# Patient Record
Sex: FEMALE | Race: WHITE | NOT HISPANIC OR LATINO | Employment: FULL TIME | URBAN - METROPOLITAN AREA
[De-identification: names, ages, dates, MRNs, and addresses within clinical notes are randomized per-mention and may not be internally consistent; named-entity substitution may affect disease eponyms.]

---

## 2021-07-20 ENCOUNTER — LAB VISIT (OUTPATIENT)
Dept: LAB | Facility: HOSPITAL | Age: 41
End: 2021-07-20
Attending: INTERNAL MEDICINE
Payer: COMMERCIAL

## 2021-07-20 ENCOUNTER — OFFICE VISIT (OUTPATIENT)
Dept: GASTROENTEROLOGY | Facility: CLINIC | Age: 41
End: 2021-07-20
Payer: COMMERCIAL

## 2021-07-20 VITALS
HEART RATE: 95 BPM | BODY MASS INDEX: 22.29 KG/M2 | DIASTOLIC BLOOD PRESSURE: 75 MMHG | HEIGHT: 60 IN | RESPIRATION RATE: 16 BRPM | SYSTOLIC BLOOD PRESSURE: 99 MMHG | WEIGHT: 113.56 LBS | OXYGEN SATURATION: 99 %

## 2021-07-20 DIAGNOSIS — R14.0 BLOATING: ICD-10-CM

## 2021-07-20 DIAGNOSIS — K59.09 CHRONIC CONSTIPATION: Primary | ICD-10-CM

## 2021-07-20 DIAGNOSIS — E73.9 LACTOSE INTOLERANCE: ICD-10-CM

## 2021-07-20 DIAGNOSIS — K59.09 CHRONIC CONSTIPATION: ICD-10-CM

## 2021-07-20 DIAGNOSIS — R10.30 LOWER ABDOMINAL PAIN: ICD-10-CM

## 2021-07-20 LAB
CRP SERPL-MCNC: 0.2 MG/L (ref 0–8.2)
ERYTHROCYTE [SEDIMENTATION RATE] IN BLOOD BY WESTERGREN METHOD: <2 MM/HR (ref 0–36)

## 2021-07-20 PROCEDURE — 99999 PR PBB SHADOW E&M-NEW PATIENT-LVL IV: CPT | Mod: PBBFAC,,, | Performed by: INTERNAL MEDICINE

## 2021-07-20 PROCEDURE — 99203 PR OFFICE/OUTPT VISIT, NEW, LEVL III, 30-44 MIN: ICD-10-PCS | Mod: S$GLB,,, | Performed by: INTERNAL MEDICINE

## 2021-07-20 PROCEDURE — 99203 OFFICE O/P NEW LOW 30 MIN: CPT | Mod: S$GLB,,, | Performed by: INTERNAL MEDICINE

## 2021-07-20 PROCEDURE — 36415 COLL VENOUS BLD VENIPUNCTURE: CPT | Mod: PO | Performed by: INTERNAL MEDICINE

## 2021-07-20 PROCEDURE — 3008F PR BODY MASS INDEX (BMI) DOCUMENTED: ICD-10-PCS | Mod: CPTII,S$GLB,, | Performed by: INTERNAL MEDICINE

## 2021-07-20 PROCEDURE — 3008F BODY MASS INDEX DOCD: CPT | Mod: CPTII,S$GLB,, | Performed by: INTERNAL MEDICINE

## 2021-07-20 PROCEDURE — 85652 RBC SED RATE AUTOMATED: CPT | Performed by: INTERNAL MEDICINE

## 2021-07-20 PROCEDURE — 1125F PR PAIN SEVERITY QUANTIFIED, PAIN PRESENT: ICD-10-PCS | Mod: CPTII,S$GLB,, | Performed by: INTERNAL MEDICINE

## 2021-07-20 PROCEDURE — 86140 C-REACTIVE PROTEIN: CPT | Performed by: INTERNAL MEDICINE

## 2021-07-20 PROCEDURE — 99999 PR PBB SHADOW E&M-NEW PATIENT-LVL IV: ICD-10-PCS | Mod: PBBFAC,,, | Performed by: INTERNAL MEDICINE

## 2021-07-20 PROCEDURE — 1125F AMNT PAIN NOTED PAIN PRSNT: CPT | Mod: CPTII,S$GLB,, | Performed by: INTERNAL MEDICINE

## 2021-08-03 ENCOUNTER — OFFICE VISIT (OUTPATIENT)
Dept: OBSTETRICS AND GYNECOLOGY | Facility: CLINIC | Age: 41
End: 2021-08-03
Payer: COMMERCIAL

## 2021-08-03 ENCOUNTER — LAB VISIT (OUTPATIENT)
Dept: LAB | Facility: HOSPITAL | Age: 41
End: 2021-08-03
Attending: NURSE PRACTITIONER
Payer: COMMERCIAL

## 2021-08-03 VITALS
SYSTOLIC BLOOD PRESSURE: 120 MMHG | BODY MASS INDEX: 22.84 KG/M2 | DIASTOLIC BLOOD PRESSURE: 68 MMHG | WEIGHT: 116.94 LBS

## 2021-08-03 DIAGNOSIS — Z12.31 ENCOUNTER FOR SCREENING MAMMOGRAM FOR MALIGNANT NEOPLASM OF BREAST: ICD-10-CM

## 2021-08-03 DIAGNOSIS — Z01.419 ENCOUNTER FOR ANNUAL ROUTINE GYNECOLOGICAL EXAMINATION: Primary | ICD-10-CM

## 2021-08-03 DIAGNOSIS — Z11.3 SCREEN FOR STD (SEXUALLY TRANSMITTED DISEASE): ICD-10-CM

## 2021-08-03 DIAGNOSIS — Z12.4 SCREENING FOR MALIGNANT NEOPLASM OF CERVIX: ICD-10-CM

## 2021-08-03 PROCEDURE — 3074F PR MOST RECENT SYSTOLIC BLOOD PRESSURE < 130 MM HG: ICD-10-PCS | Mod: CPTII,S$GLB,, | Performed by: NURSE PRACTITIONER

## 2021-08-03 PROCEDURE — 99385 PR PREVENTIVE VISIT,NEW,18-39: ICD-10-PCS | Mod: S$GLB,,, | Performed by: NURSE PRACTITIONER

## 2021-08-03 PROCEDURE — 1160F RVW MEDS BY RX/DR IN RCRD: CPT | Mod: CPTII,S$GLB,, | Performed by: NURSE PRACTITIONER

## 2021-08-03 PROCEDURE — 1160F PR REVIEW ALL MEDS BY PRESCRIBER/CLIN PHARMACIST DOCUMENTED: ICD-10-PCS | Mod: CPTII,S$GLB,, | Performed by: NURSE PRACTITIONER

## 2021-08-03 PROCEDURE — 80074 ACUTE HEPATITIS PANEL: CPT | Performed by: NURSE PRACTITIONER

## 2021-08-03 PROCEDURE — 87389 HIV-1 AG W/HIV-1&-2 AB AG IA: CPT | Performed by: NURSE PRACTITIONER

## 2021-08-03 PROCEDURE — 86592 SYPHILIS TEST NON-TREP QUAL: CPT | Performed by: NURSE PRACTITIONER

## 2021-08-03 PROCEDURE — 99385 PREV VISIT NEW AGE 18-39: CPT | Mod: S$GLB,,, | Performed by: NURSE PRACTITIONER

## 2021-08-03 PROCEDURE — 36415 COLL VENOUS BLD VENIPUNCTURE: CPT | Mod: PN | Performed by: NURSE PRACTITIONER

## 2021-08-03 PROCEDURE — 87491 CHLMYD TRACH DNA AMP PROBE: CPT | Mod: 59 | Performed by: NURSE PRACTITIONER

## 2021-08-03 PROCEDURE — 87661 TRICHOMONAS VAGINALIS AMPLIF: CPT | Performed by: NURSE PRACTITIONER

## 2021-08-03 PROCEDURE — 3078F DIAST BP <80 MM HG: CPT | Mod: CPTII,S$GLB,, | Performed by: NURSE PRACTITIONER

## 2021-08-03 PROCEDURE — 88175 CYTOPATH C/V AUTO FLUID REDO: CPT | Performed by: NURSE PRACTITIONER

## 2021-08-03 PROCEDURE — 87591 N.GONORRHOEAE DNA AMP PROB: CPT | Performed by: NURSE PRACTITIONER

## 2021-08-03 PROCEDURE — 87624 HPV HI-RISK TYP POOLED RSLT: CPT | Performed by: NURSE PRACTITIONER

## 2021-08-03 PROCEDURE — 3074F SYST BP LT 130 MM HG: CPT | Mod: CPTII,S$GLB,, | Performed by: NURSE PRACTITIONER

## 2021-08-03 PROCEDURE — 3078F PR MOST RECENT DIASTOLIC BLOOD PRESSURE < 80 MM HG: ICD-10-PCS | Mod: CPTII,S$GLB,, | Performed by: NURSE PRACTITIONER

## 2021-08-03 PROCEDURE — 1159F PR MEDICATION LIST DOCUMENTED IN MEDICAL RECORD: ICD-10-PCS | Mod: CPTII,S$GLB,, | Performed by: NURSE PRACTITIONER

## 2021-08-03 PROCEDURE — 87481 CANDIDA DNA AMP PROBE: CPT | Mod: 59 | Performed by: NURSE PRACTITIONER

## 2021-08-03 PROCEDURE — 1159F MED LIST DOCD IN RCRD: CPT | Mod: CPTII,S$GLB,, | Performed by: NURSE PRACTITIONER

## 2021-08-03 PROCEDURE — 86695 HERPES SIMPLEX TYPE 1 TEST: CPT | Performed by: NURSE PRACTITIONER

## 2021-08-03 PROCEDURE — 99999 PR PBB SHADOW E&M-EST. PATIENT-LVL III: CPT | Mod: PBBFAC,,, | Performed by: NURSE PRACTITIONER

## 2021-08-03 PROCEDURE — 3008F PR BODY MASS INDEX (BMI) DOCUMENTED: ICD-10-PCS | Mod: CPTII,S$GLB,, | Performed by: NURSE PRACTITIONER

## 2021-08-03 PROCEDURE — 99999 PR PBB SHADOW E&M-EST. PATIENT-LVL III: ICD-10-PCS | Mod: PBBFAC,,, | Performed by: NURSE PRACTITIONER

## 2021-08-03 PROCEDURE — 3008F BODY MASS INDEX DOCD: CPT | Mod: CPTII,S$GLB,, | Performed by: NURSE PRACTITIONER

## 2021-08-03 PROCEDURE — 1126F AMNT PAIN NOTED NONE PRSNT: CPT | Mod: CPTII,S$GLB,, | Performed by: NURSE PRACTITIONER

## 2021-08-03 PROCEDURE — 1126F PR PAIN SEVERITY QUANTIFIED, NO PAIN PRESENT: ICD-10-PCS | Mod: CPTII,S$GLB,, | Performed by: NURSE PRACTITIONER

## 2021-08-04 LAB
HAV IGM SERPL QL IA: NEGATIVE
HBV CORE IGM SERPL QL IA: NEGATIVE
HBV SURFACE AG SERPL QL IA: NEGATIVE
HCV AB SERPL QL IA: NEGATIVE
HIV 1+2 AB+HIV1 P24 AG SERPL QL IA: NEGATIVE
RPR SER QL: NORMAL

## 2021-08-05 LAB
HSV1 IGG SERPL QL IA: NEGATIVE
HSV2 IGG SERPL QL IA: NEGATIVE

## 2021-08-06 LAB
BACTERIAL VAGINOSIS DNA: NEGATIVE
C TRACH DNA SPEC QL NAA+PROBE: NOT DETECTED
CANDIDA GLABRATA DNA: NEGATIVE
CANDIDA KRUSEI DNA: NEGATIVE
CANDIDA RRNA VAG QL PROBE: NEGATIVE
CLINICAL INFO: NORMAL
CYTO CVX: NORMAL
CYTOLOGIST CVX/VAG CYTO: NORMAL
CYTOLOGIST CVX/VAG CYTO: NORMAL
CYTOLOGY CMNT CVX/VAG CYTO-IMP: NORMAL
CYTOLOGY PAP THIN PREP EXPLANATION: NORMAL
DATE OF PREVIOUS PAP: NORMAL
DATE PREVIOUS BX: NO
GEN CATEG CVX/VAG CYTO-IMP: NORMAL
HPV I/H RISK 4 DNA CVX QL NAA+PROBE: NOT DETECTED
LMP START DATE: NORMAL
MICROORGANISM CVX/VAG CYTO: NORMAL
N GONORRHOEA DNA SPEC QL NAA+PROBE: NOT DETECTED
PATHOLOGIST CVX/VAG CYTO: NORMAL
SERVICE CMNT-IMP: NORMAL
SPECIMEN SOURCE CVX/VAG CYTO: NORMAL
STAT OF ADQ CVX/VAG CYTO-IMP: NORMAL
T VAGINALIS RRNA GENITAL QL PROBE: NEGATIVE

## 2021-09-29 ENCOUNTER — OFFICE VISIT (OUTPATIENT)
Dept: PSYCHIATRY | Facility: CLINIC | Age: 41
End: 2021-09-29
Payer: COMMERCIAL

## 2021-09-29 ENCOUNTER — CLINICAL SUPPORT (OUTPATIENT)
Dept: PSYCHIATRY | Facility: CLINIC | Age: 41
End: 2021-09-29
Payer: COMMERCIAL

## 2021-09-29 VITALS
BODY MASS INDEX: 22.34 KG/M2 | HEART RATE: 72 BPM | HEIGHT: 60 IN | SYSTOLIC BLOOD PRESSURE: 101 MMHG | DIASTOLIC BLOOD PRESSURE: 56 MMHG | WEIGHT: 113.81 LBS

## 2021-09-29 DIAGNOSIS — F33.1 MODERATE EPISODE OF RECURRENT MAJOR DEPRESSIVE DISORDER: ICD-10-CM

## 2021-09-29 DIAGNOSIS — F42.9 OBSESSIVE-COMPULSIVE DISORDER, UNSPECIFIED TYPE: ICD-10-CM

## 2021-09-29 DIAGNOSIS — F90.2 ADHD (ATTENTION DEFICIT HYPERACTIVITY DISORDER), COMBINED TYPE: Primary | ICD-10-CM

## 2021-09-29 LAB
AMPHET+METHAMPHET UR QL: NEGATIVE
BARBITURATES UR QL SCN>200 NG/ML: NEGATIVE
BENZODIAZ UR QL SCN>200 NG/ML: NEGATIVE
BZE UR QL SCN: NEGATIVE
CANNABINOIDS UR QL SCN: NEGATIVE
CREAT UR-MCNC: 34 MG/DL (ref 15–325)
ETHANOL UR-MCNC: <10 MG/DL
METHADONE UR QL SCN>300 NG/ML: NEGATIVE
OPIATES UR QL SCN: NEGATIVE
PCP UR QL SCN>25 NG/ML: NEGATIVE
TOXICOLOGY INFORMATION: NORMAL

## 2021-09-29 PROCEDURE — 90792 PR PSYCHIATRIC DIAGNOSTIC EVALUATION W/MEDICAL SERVICES: ICD-10-PCS | Mod: S$GLB,,, | Performed by: PHYSICIAN ASSISTANT

## 2021-09-29 PROCEDURE — 1159F PR MEDICATION LIST DOCUMENTED IN MEDICAL RECORD: ICD-10-PCS | Mod: CPTII,S$GLB,, | Performed by: PHYSICIAN ASSISTANT

## 2021-09-29 PROCEDURE — 99999 PR PBB SHADOW E&M-EST. PATIENT-LVL III: CPT | Mod: PBBFAC,,, | Performed by: PHYSICIAN ASSISTANT

## 2021-09-29 PROCEDURE — 1160F RVW MEDS BY RX/DR IN RCRD: CPT | Mod: CPTII,S$GLB,, | Performed by: PHYSICIAN ASSISTANT

## 2021-09-29 PROCEDURE — 99999 PR PBB SHADOW E&M-EST. PATIENT-LVL III: ICD-10-PCS | Mod: PBBFAC,,, | Performed by: PHYSICIAN ASSISTANT

## 2021-09-29 PROCEDURE — 3074F PR MOST RECENT SYSTOLIC BLOOD PRESSURE < 130 MM HG: ICD-10-PCS | Mod: CPTII,S$GLB,, | Performed by: PHYSICIAN ASSISTANT

## 2021-09-29 PROCEDURE — 1159F MED LIST DOCD IN RCRD: CPT | Mod: CPTII,S$GLB,, | Performed by: PHYSICIAN ASSISTANT

## 2021-09-29 PROCEDURE — 3008F BODY MASS INDEX DOCD: CPT | Mod: CPTII,S$GLB,, | Performed by: PHYSICIAN ASSISTANT

## 2021-09-29 PROCEDURE — 3078F DIAST BP <80 MM HG: CPT | Mod: CPTII,S$GLB,, | Performed by: PHYSICIAN ASSISTANT

## 2021-09-29 PROCEDURE — 1160F PR REVIEW ALL MEDS BY PRESCRIBER/CLIN PHARMACIST DOCUMENTED: ICD-10-PCS | Mod: CPTII,S$GLB,, | Performed by: PHYSICIAN ASSISTANT

## 2021-09-29 PROCEDURE — 3074F SYST BP LT 130 MM HG: CPT | Mod: CPTII,S$GLB,, | Performed by: PHYSICIAN ASSISTANT

## 2021-09-29 PROCEDURE — 90792 PSYCH DIAG EVAL W/MED SRVCS: CPT | Mod: S$GLB,,, | Performed by: PHYSICIAN ASSISTANT

## 2021-09-29 PROCEDURE — 3078F PR MOST RECENT DIASTOLIC BLOOD PRESSURE < 80 MM HG: ICD-10-PCS | Mod: CPTII,S$GLB,, | Performed by: PHYSICIAN ASSISTANT

## 2021-09-29 PROCEDURE — 3008F PR BODY MASS INDEX (BMI) DOCUMENTED: ICD-10-PCS | Mod: CPTII,S$GLB,, | Performed by: PHYSICIAN ASSISTANT

## 2021-09-29 PROCEDURE — 80307 DRUG TEST PRSMV CHEM ANLYZR: CPT | Performed by: PHYSICIAN ASSISTANT

## 2021-09-29 RX ORDER — LISDEXAMFETAMINE DIMESYLATE CAPSULES 20 MG/1
20 CAPSULE ORAL EVERY MORNING
Qty: 30 CAPSULE | Refills: 0 | Status: SHIPPED | OUTPATIENT
Start: 2021-09-29 | End: 2021-09-30 | Stop reason: ALTCHOICE

## 2021-09-29 RX ORDER — FLUOXETINE 10 MG/1
10 CAPSULE ORAL DAILY
Qty: 30 CAPSULE | Refills: 0 | Status: SHIPPED | OUTPATIENT
Start: 2021-09-29 | End: 2021-10-26

## 2021-09-29 RX ORDER — BUPROPION HYDROCHLORIDE 150 MG/1
150 TABLET ORAL DAILY
Qty: 30 TABLET | Refills: 1 | Status: SHIPPED | OUTPATIENT
Start: 2021-09-29 | End: 2021-10-25

## 2021-09-30 ENCOUNTER — PATIENT MESSAGE (OUTPATIENT)
Dept: PSYCHIATRY | Facility: CLINIC | Age: 41
End: 2021-09-30

## 2021-09-30 DIAGNOSIS — F90.2 ADHD (ATTENTION DEFICIT HYPERACTIVITY DISORDER), COMBINED TYPE: Primary | ICD-10-CM

## 2021-09-30 RX ORDER — DEXTROAMPHETAMINE SACCHARATE, AMPHETAMINE ASPARTATE MONOHYDRATE, DEXTROAMPHETAMINE SULFATE AND AMPHETAMINE SULFATE 2.5; 2.5; 2.5; 2.5 MG/1; MG/1; MG/1; MG/1
10 CAPSULE, EXTENDED RELEASE ORAL EVERY MORNING
Qty: 30 CAPSULE | Refills: 0 | Status: SHIPPED | OUTPATIENT
Start: 2021-09-30 | End: 2021-10-04 | Stop reason: SDUPTHER

## 2021-10-04 DIAGNOSIS — F90.2 ADHD (ATTENTION DEFICIT HYPERACTIVITY DISORDER), COMBINED TYPE: ICD-10-CM

## 2021-10-04 RX ORDER — DEXTROAMPHETAMINE SACCHARATE, AMPHETAMINE ASPARTATE MONOHYDRATE, DEXTROAMPHETAMINE SULFATE AND AMPHETAMINE SULFATE 2.5; 2.5; 2.5; 2.5 MG/1; MG/1; MG/1; MG/1
10 CAPSULE, EXTENDED RELEASE ORAL EVERY MORNING
Qty: 30 CAPSULE | Refills: 0 | Status: SHIPPED | OUTPATIENT
Start: 2021-10-04 | End: 2021-10-27 | Stop reason: ALTCHOICE

## 2021-10-27 ENCOUNTER — OFFICE VISIT (OUTPATIENT)
Dept: PSYCHIATRY | Facility: CLINIC | Age: 41
End: 2021-10-27
Payer: COMMERCIAL

## 2021-10-27 VITALS
SYSTOLIC BLOOD PRESSURE: 114 MMHG | WEIGHT: 111.13 LBS | DIASTOLIC BLOOD PRESSURE: 55 MMHG | BODY MASS INDEX: 21.7 KG/M2 | HEART RATE: 89 BPM

## 2021-10-27 DIAGNOSIS — F33.41 RECURRENT MAJOR DEPRESSIVE DISORDER, IN PARTIAL REMISSION: ICD-10-CM

## 2021-10-27 DIAGNOSIS — F90.2 ADHD (ATTENTION DEFICIT HYPERACTIVITY DISORDER), COMBINED TYPE: Primary | ICD-10-CM

## 2021-10-27 PROCEDURE — 3078F PR MOST RECENT DIASTOLIC BLOOD PRESSURE < 80 MM HG: ICD-10-PCS | Mod: CPTII,S$GLB,, | Performed by: PHYSICIAN ASSISTANT

## 2021-10-27 PROCEDURE — 3008F PR BODY MASS INDEX (BMI) DOCUMENTED: ICD-10-PCS | Mod: CPTII,S$GLB,, | Performed by: PHYSICIAN ASSISTANT

## 2021-10-27 PROCEDURE — 3074F SYST BP LT 130 MM HG: CPT | Mod: CPTII,S$GLB,, | Performed by: PHYSICIAN ASSISTANT

## 2021-10-27 PROCEDURE — 1160F PR REVIEW ALL MEDS BY PRESCRIBER/CLIN PHARMACIST DOCUMENTED: ICD-10-PCS | Mod: CPTII,S$GLB,, | Performed by: PHYSICIAN ASSISTANT

## 2021-10-27 PROCEDURE — 99999 PR PBB SHADOW E&M-EST. PATIENT-LVL II: ICD-10-PCS | Mod: PBBFAC,,, | Performed by: PHYSICIAN ASSISTANT

## 2021-10-27 PROCEDURE — 1160F RVW MEDS BY RX/DR IN RCRD: CPT | Mod: CPTII,S$GLB,, | Performed by: PHYSICIAN ASSISTANT

## 2021-10-27 PROCEDURE — 99214 OFFICE O/P EST MOD 30 MIN: CPT | Mod: S$GLB,,, | Performed by: PHYSICIAN ASSISTANT

## 2021-10-27 PROCEDURE — 3078F DIAST BP <80 MM HG: CPT | Mod: CPTII,S$GLB,, | Performed by: PHYSICIAN ASSISTANT

## 2021-10-27 PROCEDURE — 99214 PR OFFICE/OUTPT VISIT, EST, LEVL IV, 30-39 MIN: ICD-10-PCS | Mod: S$GLB,,, | Performed by: PHYSICIAN ASSISTANT

## 2021-10-27 PROCEDURE — 3008F BODY MASS INDEX DOCD: CPT | Mod: CPTII,S$GLB,, | Performed by: PHYSICIAN ASSISTANT

## 2021-10-27 PROCEDURE — 1159F PR MEDICATION LIST DOCUMENTED IN MEDICAL RECORD: ICD-10-PCS | Mod: CPTII,S$GLB,, | Performed by: PHYSICIAN ASSISTANT

## 2021-10-27 PROCEDURE — 3074F PR MOST RECENT SYSTOLIC BLOOD PRESSURE < 130 MM HG: ICD-10-PCS | Mod: CPTII,S$GLB,, | Performed by: PHYSICIAN ASSISTANT

## 2021-10-27 PROCEDURE — 1159F MED LIST DOCD IN RCRD: CPT | Mod: CPTII,S$GLB,, | Performed by: PHYSICIAN ASSISTANT

## 2021-10-27 PROCEDURE — 99999 PR PBB SHADOW E&M-EST. PATIENT-LVL II: CPT | Mod: PBBFAC,,, | Performed by: PHYSICIAN ASSISTANT

## 2021-10-27 RX ORDER — METHYLPHENIDATE HYDROCHLORIDE 18 MG/1
18 TABLET ORAL EVERY MORNING
Qty: 30 TABLET | Refills: 0 | Status: SHIPPED | OUTPATIENT
Start: 2021-10-27 | End: 2021-11-03 | Stop reason: DRUGHIGH

## 2021-11-03 ENCOUNTER — PATIENT MESSAGE (OUTPATIENT)
Dept: PSYCHIATRY | Facility: CLINIC | Age: 41
End: 2021-11-03
Payer: COMMERCIAL

## 2021-11-03 DIAGNOSIS — F90.2 ADHD (ATTENTION DEFICIT HYPERACTIVITY DISORDER), COMBINED TYPE: Primary | ICD-10-CM

## 2021-11-03 RX ORDER — METHYLPHENIDATE HYDROCHLORIDE 27 MG/1
27 TABLET ORAL EVERY MORNING
Qty: 30 TABLET | Refills: 0 | Status: SHIPPED | OUTPATIENT
Start: 2021-11-03 | End: 2021-11-17 | Stop reason: ALTCHOICE

## 2021-11-11 ENCOUNTER — PATIENT MESSAGE (OUTPATIENT)
Dept: PSYCHIATRY | Facility: CLINIC | Age: 41
End: 2021-11-11
Payer: COMMERCIAL

## 2021-11-17 ENCOUNTER — OFFICE VISIT (OUTPATIENT)
Dept: PSYCHIATRY | Facility: CLINIC | Age: 41
End: 2021-11-17
Payer: COMMERCIAL

## 2021-11-17 VITALS
WEIGHT: 112.31 LBS | DIASTOLIC BLOOD PRESSURE: 60 MMHG | HEART RATE: 85 BPM | SYSTOLIC BLOOD PRESSURE: 130 MMHG | BODY MASS INDEX: 21.94 KG/M2

## 2021-11-17 DIAGNOSIS — F90.2 ADHD (ATTENTION DEFICIT HYPERACTIVITY DISORDER), COMBINED TYPE: Primary | ICD-10-CM

## 2021-11-17 DIAGNOSIS — F33.1 MODERATE EPISODE OF RECURRENT MAJOR DEPRESSIVE DISORDER: ICD-10-CM

## 2021-11-17 PROCEDURE — 99214 OFFICE O/P EST MOD 30 MIN: CPT | Mod: S$GLB,,, | Performed by: PHYSICIAN ASSISTANT

## 2021-11-17 PROCEDURE — 3078F DIAST BP <80 MM HG: CPT | Mod: CPTII,S$GLB,, | Performed by: PHYSICIAN ASSISTANT

## 2021-11-17 PROCEDURE — 1160F PR REVIEW ALL MEDS BY PRESCRIBER/CLIN PHARMACIST DOCUMENTED: ICD-10-PCS | Mod: CPTII,S$GLB,, | Performed by: PHYSICIAN ASSISTANT

## 2021-11-17 PROCEDURE — 3078F PR MOST RECENT DIASTOLIC BLOOD PRESSURE < 80 MM HG: ICD-10-PCS | Mod: CPTII,S$GLB,, | Performed by: PHYSICIAN ASSISTANT

## 2021-11-17 PROCEDURE — 99999 PR PBB SHADOW E&M-EST. PATIENT-LVL II: CPT | Mod: PBBFAC,,, | Performed by: PHYSICIAN ASSISTANT

## 2021-11-17 PROCEDURE — 1160F RVW MEDS BY RX/DR IN RCRD: CPT | Mod: CPTII,S$GLB,, | Performed by: PHYSICIAN ASSISTANT

## 2021-11-17 PROCEDURE — 99214 PR OFFICE/OUTPT VISIT, EST, LEVL IV, 30-39 MIN: ICD-10-PCS | Mod: S$GLB,,, | Performed by: PHYSICIAN ASSISTANT

## 2021-11-17 PROCEDURE — 3008F BODY MASS INDEX DOCD: CPT | Mod: CPTII,S$GLB,, | Performed by: PHYSICIAN ASSISTANT

## 2021-11-17 PROCEDURE — 3075F SYST BP GE 130 - 139MM HG: CPT | Mod: CPTII,S$GLB,, | Performed by: PHYSICIAN ASSISTANT

## 2021-11-17 PROCEDURE — 3075F PR MOST RECENT SYSTOLIC BLOOD PRESS GE 130-139MM HG: ICD-10-PCS | Mod: CPTII,S$GLB,, | Performed by: PHYSICIAN ASSISTANT

## 2021-11-17 PROCEDURE — 3008F PR BODY MASS INDEX (BMI) DOCUMENTED: ICD-10-PCS | Mod: CPTII,S$GLB,, | Performed by: PHYSICIAN ASSISTANT

## 2021-11-17 PROCEDURE — 99999 PR PBB SHADOW E&M-EST. PATIENT-LVL II: ICD-10-PCS | Mod: PBBFAC,,, | Performed by: PHYSICIAN ASSISTANT

## 2021-11-17 PROCEDURE — 1159F MED LIST DOCD IN RCRD: CPT | Mod: CPTII,S$GLB,, | Performed by: PHYSICIAN ASSISTANT

## 2021-11-17 PROCEDURE — 1159F PR MEDICATION LIST DOCUMENTED IN MEDICAL RECORD: ICD-10-PCS | Mod: CPTII,S$GLB,, | Performed by: PHYSICIAN ASSISTANT

## 2021-11-17 RX ORDER — DEXTROAMPHETAMINE SACCHARATE, AMPHETAMINE ASPARTATE MONOHYDRATE, DEXTROAMPHETAMINE SULFATE AND AMPHETAMINE SULFATE 2.5; 2.5; 2.5; 2.5 MG/1; MG/1; MG/1; MG/1
10 CAPSULE, EXTENDED RELEASE ORAL EVERY MORNING
Qty: 30 CAPSULE | Refills: 0 | Status: SHIPPED | OUTPATIENT
Start: 2021-11-17 | End: 2021-12-13 | Stop reason: SDUPTHER

## 2021-11-17 RX ORDER — BUPROPION HYDROCHLORIDE 150 MG/1
150 TABLET ORAL DAILY
Qty: 30 TABLET | Refills: 1 | Status: SHIPPED | OUTPATIENT
Start: 2021-11-17 | End: 2021-11-23

## 2021-12-13 ENCOUNTER — PATIENT MESSAGE (OUTPATIENT)
Dept: PSYCHIATRY | Facility: CLINIC | Age: 41
End: 2021-12-13
Payer: COMMERCIAL

## 2021-12-13 RX ORDER — DEXTROAMPHETAMINE SACCHARATE, AMPHETAMINE ASPARTATE MONOHYDRATE, DEXTROAMPHETAMINE SULFATE AND AMPHETAMINE SULFATE 2.5; 2.5; 2.5; 2.5 MG/1; MG/1; MG/1; MG/1
10 CAPSULE, EXTENDED RELEASE ORAL EVERY MORNING
Qty: 30 CAPSULE | Refills: 0 | Status: SHIPPED | OUTPATIENT
Start: 2021-12-13 | End: 2022-01-11 | Stop reason: SDUPTHER

## 2021-12-27 ENCOUNTER — PATIENT MESSAGE (OUTPATIENT)
Dept: PSYCHIATRY | Facility: CLINIC | Age: 41
End: 2021-12-27
Payer: COMMERCIAL

## 2022-01-10 ENCOUNTER — PATIENT MESSAGE (OUTPATIENT)
Dept: PSYCHIATRY | Facility: CLINIC | Age: 42
End: 2022-01-10
Payer: COMMERCIAL

## 2022-01-11 RX ORDER — DEXTROAMPHETAMINE SACCHARATE, AMPHETAMINE ASPARTATE MONOHYDRATE, DEXTROAMPHETAMINE SULFATE AND AMPHETAMINE SULFATE 2.5; 2.5; 2.5; 2.5 MG/1; MG/1; MG/1; MG/1
10 CAPSULE, EXTENDED RELEASE ORAL EVERY MORNING
Qty: 30 CAPSULE | Refills: 0 | Status: SHIPPED | OUTPATIENT
Start: 2022-01-11 | End: 2022-01-19 | Stop reason: DRUGHIGH

## 2022-01-19 ENCOUNTER — OFFICE VISIT (OUTPATIENT)
Dept: PSYCHIATRY | Facility: CLINIC | Age: 42
End: 2022-01-19
Payer: COMMERCIAL

## 2022-01-19 VITALS
HEART RATE: 65 BPM | WEIGHT: 111 LBS | BODY MASS INDEX: 21.68 KG/M2 | SYSTOLIC BLOOD PRESSURE: 112 MMHG | DIASTOLIC BLOOD PRESSURE: 53 MMHG

## 2022-01-19 DIAGNOSIS — F33.1 MODERATE EPISODE OF RECURRENT MAJOR DEPRESSIVE DISORDER: Primary | ICD-10-CM

## 2022-01-19 DIAGNOSIS — F90.2 ADHD (ATTENTION DEFICIT HYPERACTIVITY DISORDER), COMBINED TYPE: ICD-10-CM

## 2022-01-19 PROCEDURE — 99999 PR PBB SHADOW E&M-EST. PATIENT-LVL III: ICD-10-PCS | Mod: PBBFAC,,, | Performed by: PHYSICIAN ASSISTANT

## 2022-01-19 PROCEDURE — 3078F DIAST BP <80 MM HG: CPT | Mod: CPTII,S$GLB,, | Performed by: PHYSICIAN ASSISTANT

## 2022-01-19 PROCEDURE — 1160F PR REVIEW ALL MEDS BY PRESCRIBER/CLIN PHARMACIST DOCUMENTED: ICD-10-PCS | Mod: CPTII,S$GLB,, | Performed by: PHYSICIAN ASSISTANT

## 2022-01-19 PROCEDURE — 1160F RVW MEDS BY RX/DR IN RCRD: CPT | Mod: CPTII,S$GLB,, | Performed by: PHYSICIAN ASSISTANT

## 2022-01-19 PROCEDURE — 99999 PR PBB SHADOW E&M-EST. PATIENT-LVL III: CPT | Mod: PBBFAC,,, | Performed by: PHYSICIAN ASSISTANT

## 2022-01-19 PROCEDURE — 3008F BODY MASS INDEX DOCD: CPT | Mod: CPTII,S$GLB,, | Performed by: PHYSICIAN ASSISTANT

## 2022-01-19 PROCEDURE — 99214 OFFICE O/P EST MOD 30 MIN: CPT | Mod: S$GLB,,, | Performed by: PHYSICIAN ASSISTANT

## 2022-01-19 PROCEDURE — 3074F SYST BP LT 130 MM HG: CPT | Mod: CPTII,S$GLB,, | Performed by: PHYSICIAN ASSISTANT

## 2022-01-19 PROCEDURE — 99214 PR OFFICE/OUTPT VISIT, EST, LEVL IV, 30-39 MIN: ICD-10-PCS | Mod: S$GLB,,, | Performed by: PHYSICIAN ASSISTANT

## 2022-01-19 PROCEDURE — 1159F PR MEDICATION LIST DOCUMENTED IN MEDICAL RECORD: ICD-10-PCS | Mod: CPTII,S$GLB,, | Performed by: PHYSICIAN ASSISTANT

## 2022-01-19 PROCEDURE — 3078F PR MOST RECENT DIASTOLIC BLOOD PRESSURE < 80 MM HG: ICD-10-PCS | Mod: CPTII,S$GLB,, | Performed by: PHYSICIAN ASSISTANT

## 2022-01-19 PROCEDURE — 3074F PR MOST RECENT SYSTOLIC BLOOD PRESSURE < 130 MM HG: ICD-10-PCS | Mod: CPTII,S$GLB,, | Performed by: PHYSICIAN ASSISTANT

## 2022-01-19 PROCEDURE — 1159F MED LIST DOCD IN RCRD: CPT | Mod: CPTII,S$GLB,, | Performed by: PHYSICIAN ASSISTANT

## 2022-01-19 PROCEDURE — 3008F PR BODY MASS INDEX (BMI) DOCUMENTED: ICD-10-PCS | Mod: CPTII,S$GLB,, | Performed by: PHYSICIAN ASSISTANT

## 2022-01-19 RX ORDER — DEXTROAMPHETAMINE SACCHARATE, AMPHETAMINE ASPARTATE MONOHYDRATE, DEXTROAMPHETAMINE SULFATE AND AMPHETAMINE SULFATE 3.75; 3.75; 3.75; 3.75 MG/1; MG/1; MG/1; MG/1
15 CAPSULE, EXTENDED RELEASE ORAL EVERY MORNING
Qty: 30 CAPSULE | Refills: 0 | Status: SHIPPED | OUTPATIENT
Start: 2022-01-19 | End: 2022-02-16 | Stop reason: SDUPTHER

## 2022-01-19 NOTE — PROGRESS NOTES
Outpatient Psychiatry Follow-Up Visit ([place])    1/19/2022    Clinical Status of Patient:  Outpatient (Ambulatory)    Chief Complaint:  Annalise Birch is a 42 y.o. female who presents today for follow-up of anxiety and attention problems.  Met with patient.     Current Meds  Wellbutrin  mg  Adderall XR 10 mg    Interval History and Content of Current Session:  Interim Events/Subjective Report/Content of Current Session:     Patient presents to follow up after switching back form Concerta to Adderall XR 10 mg. Patient reports that she has been doing much better since re-starting the Adderall. She is no longer experiencing headaches. She reports that the medication is not working as well as it initially was and she suspects that she has adjusted to it. She reports that she has to pay attention to what she's doing or she'll find herself getting sidetracked.     The patient denies adverse effects from the medication. She is no longer experiencing decreased appetite. She is sleeping well.     She describes her mood as happy and states her anxiety is lessened on the medication.   The wellbutrin continues to work well.       Psychotherapy:  · Target symptoms: depression, distractability, lack of focus  · Why chosen therapy is appropriate versus another modality: relevant to diagnosis  · Outcome monitoring methods: self-report  · Therapeutic intervention type: supportive psychotherapy  · Topics discussed/themes: building skills sets for symptom management, symptom recognition, financial stressors  · The patient's response to the intervention is accepting. The patient's progress toward treatment goals is good.   · Duration of intervention: 5 minutes.    Review of Systems   · PSYCHIATRIC: Pertinant items are noted in the narrative.    Past Medical, Family and Social History: The patient's past medical, family and social history have been reviewed and updated as appropriate within the electronic medical record - see  encounter notes.    Compliance: yes    Side effects: None    Risk Parameters:  Patient reports no suicidal ideation  Patient reports no homicidal ideation  Patient reports no self-injurious behavior  Patient reports no violent behavior    Exam (detailed: at least 9 elements; comprehensive: all 15 elements)   Constitutional  Vitals:  Most recent vital signs, dated less than 90 days prior to this appointment, were reviewed.   Vitals:    01/19/22 0907   BP: (!) 112/53   Pulse: 65   Weight: 50.4 kg (111 lb)        General:  unremarkable, age appropriate, casually dressed     Musculoskeletal  Muscle Strength/Tone:  not examined   Gait & Station:  non-ataxic     Psychiatric  Speech:  no latency; no press   Mood & Affect:  steady  congruent and appropriate   Thought Process:  normal and logical   Associations:  intact   Thought Content:  normal, no suicidality, no homicidality, delusions, or paranoia   Insight:  intact   Judgement: behavior is adequate to circumstances   Orientation:  grossly intact   Memory: intact for content of interview   Language: grossly intact   Attention Span & Concentration:  able to focus   Fund of Knowledge:  intact and appropriate to age and level of education     Assessment and Diagnosis   Status/Progress: Based on the examination today, the patient's problem(s) is/are inadequately controlled.  New problems have not been presented today.   Lack of compliance are not complicating management of the primary condition.  There are no active rule-out diagnoses for this patient at this time.     General Impression: The patient is a 31 year old female with a psychiatric history of depression, anxiety, and ADHD. The patient reports that her symptoms are improved on Wellbutrin 150 mg and Adderall XR 10 mg      ICD-10-CM ICD-9-CM   1. Moderate episode of recurrent major depressive disorder  F33.1 296.32   2. ADHD (attention deficit hyperactivity disorder), combined type  F90.2 314.01        Intervention/Counseling/Treatment Plan   · Medication Management: The risks and benefits of medication were discussed with the patient.   · Continue Wellbutrin 150 mg daily  · Increase from Adderall XR 10 mg to 15 mg.   Discussed with patient informed consent, risks vs. benefits, alternative treatments, side effect profile and the inherent unpredictability of individual responses to these treatments. The patient expresses understanding of the above and displays the capacity to agree with this current plan and had no other questions.  Encouraged patient to keep future appointments.   Encouraged patient to message or call with questions or concerns  Safety plan reviewed with patient for worsening condition or suicidal ideations. In the event of an emergency patient was advised to go to the emergency room.      Return to Clinic: 2 months, 3 months

## 2022-01-20 ENCOUNTER — HOSPITAL ENCOUNTER (OUTPATIENT)
Dept: RADIOLOGY | Facility: HOSPITAL | Age: 42
Discharge: HOME | End: 2022-01-20
Attending: NURSE PRACTITIONER
Payer: COMMERCIAL

## 2022-01-20 VITALS — BODY MASS INDEX: 21.6 KG/M2 | WEIGHT: 110 LBS | HEIGHT: 60 IN

## 2022-01-20 DIAGNOSIS — Z12.31 ENCOUNTER FOR SCREENING MAMMOGRAM FOR MALIGNANT NEOPLASM OF BREAST: ICD-10-CM

## 2022-01-20 PROCEDURE — 77063 BREAST TOMOSYNTHESIS BI: CPT | Mod: 26,,, | Performed by: RADIOLOGY

## 2022-01-20 PROCEDURE — 77067 MAMMO DIGITAL SCREENING BILAT WITH TOMO: ICD-10-PCS | Mod: 26,,, | Performed by: RADIOLOGY

## 2022-01-20 PROCEDURE — 77067 SCR MAMMO BI INCL CAD: CPT | Mod: TC,PO

## 2022-01-20 PROCEDURE — 77063 MAMMO DIGITAL SCREENING BILAT WITH TOMO: ICD-10-PCS | Mod: 26,,, | Performed by: RADIOLOGY

## 2022-01-20 PROCEDURE — 77067 SCR MAMMO BI INCL CAD: CPT | Mod: 26,,, | Performed by: RADIOLOGY

## 2022-01-20 PROCEDURE — 77063 BREAST TOMOSYNTHESIS BI: CPT | Mod: TC,PO

## 2022-02-15 ENCOUNTER — PATIENT MESSAGE (OUTPATIENT)
Dept: PSYCHIATRY | Facility: CLINIC | Age: 42
End: 2022-02-15
Payer: COMMERCIAL

## 2022-02-16 DIAGNOSIS — F90.2 ADHD (ATTENTION DEFICIT HYPERACTIVITY DISORDER), COMBINED TYPE: Primary | ICD-10-CM

## 2022-02-16 RX ORDER — DEXTROAMPHETAMINE SACCHARATE, AMPHETAMINE ASPARTATE MONOHYDRATE, DEXTROAMPHETAMINE SULFATE AND AMPHETAMINE SULFATE 3.75; 3.75; 3.75; 3.75 MG/1; MG/1; MG/1; MG/1
15 CAPSULE, EXTENDED RELEASE ORAL EVERY MORNING
Qty: 30 CAPSULE | Refills: 0 | Status: SHIPPED | OUTPATIENT
Start: 2022-02-16 | End: 2022-03-23 | Stop reason: SDUPTHER

## 2022-02-28 ENCOUNTER — PATIENT MESSAGE (OUTPATIENT)
Dept: PSYCHIATRY | Facility: CLINIC | Age: 42
End: 2022-02-28
Payer: COMMERCIAL

## 2022-03-22 ENCOUNTER — PATIENT MESSAGE (OUTPATIENT)
Dept: PSYCHIATRY | Facility: CLINIC | Age: 42
End: 2022-03-22
Payer: COMMERCIAL

## 2022-03-23 DIAGNOSIS — F90.2 ADHD (ATTENTION DEFICIT HYPERACTIVITY DISORDER), COMBINED TYPE: ICD-10-CM

## 2022-03-23 RX ORDER — DEXTROAMPHETAMINE SACCHARATE, AMPHETAMINE ASPARTATE MONOHYDRATE, DEXTROAMPHETAMINE SULFATE AND AMPHETAMINE SULFATE 3.75; 3.75; 3.75; 3.75 MG/1; MG/1; MG/1; MG/1
15 CAPSULE, EXTENDED RELEASE ORAL EVERY MORNING
Qty: 30 CAPSULE | Refills: 0 | Status: SHIPPED | OUTPATIENT
Start: 2022-03-23 | End: 2022-04-12 | Stop reason: DRUGHIGH

## 2022-04-12 ENCOUNTER — OFFICE VISIT (OUTPATIENT)
Dept: PSYCHIATRY | Facility: CLINIC | Age: 42
End: 2022-04-12
Payer: COMMERCIAL

## 2022-04-12 VITALS
SYSTOLIC BLOOD PRESSURE: 124 MMHG | HEART RATE: 79 BPM | WEIGHT: 110.56 LBS | DIASTOLIC BLOOD PRESSURE: 58 MMHG | BODY MASS INDEX: 21.59 KG/M2

## 2022-04-12 DIAGNOSIS — F90.2 ADHD (ATTENTION DEFICIT HYPERACTIVITY DISORDER), COMBINED TYPE: Primary | ICD-10-CM

## 2022-04-12 DIAGNOSIS — F33.1 MODERATE EPISODE OF RECURRENT MAJOR DEPRESSIVE DISORDER: ICD-10-CM

## 2022-04-12 PROCEDURE — 99214 OFFICE O/P EST MOD 30 MIN: CPT | Mod: S$GLB,,, | Performed by: PHYSICIAN ASSISTANT

## 2022-04-12 PROCEDURE — 1160F RVW MEDS BY RX/DR IN RCRD: CPT | Mod: CPTII,S$GLB,, | Performed by: PHYSICIAN ASSISTANT

## 2022-04-12 PROCEDURE — 3074F PR MOST RECENT SYSTOLIC BLOOD PRESSURE < 130 MM HG: ICD-10-PCS | Mod: CPTII,S$GLB,, | Performed by: PHYSICIAN ASSISTANT

## 2022-04-12 PROCEDURE — 1159F PR MEDICATION LIST DOCUMENTED IN MEDICAL RECORD: ICD-10-PCS | Mod: CPTII,S$GLB,, | Performed by: PHYSICIAN ASSISTANT

## 2022-04-12 PROCEDURE — 1159F MED LIST DOCD IN RCRD: CPT | Mod: CPTII,S$GLB,, | Performed by: PHYSICIAN ASSISTANT

## 2022-04-12 PROCEDURE — 3074F SYST BP LT 130 MM HG: CPT | Mod: CPTII,S$GLB,, | Performed by: PHYSICIAN ASSISTANT

## 2022-04-12 PROCEDURE — 99999 PR PBB SHADOW E&M-EST. PATIENT-LVL II: CPT | Mod: PBBFAC,,, | Performed by: PHYSICIAN ASSISTANT

## 2022-04-12 PROCEDURE — 3078F PR MOST RECENT DIASTOLIC BLOOD PRESSURE < 80 MM HG: ICD-10-PCS | Mod: CPTII,S$GLB,, | Performed by: PHYSICIAN ASSISTANT

## 2022-04-12 PROCEDURE — 1160F PR REVIEW ALL MEDS BY PRESCRIBER/CLIN PHARMACIST DOCUMENTED: ICD-10-PCS | Mod: CPTII,S$GLB,, | Performed by: PHYSICIAN ASSISTANT

## 2022-04-12 PROCEDURE — 99214 PR OFFICE/OUTPT VISIT, EST, LEVL IV, 30-39 MIN: ICD-10-PCS | Mod: S$GLB,,, | Performed by: PHYSICIAN ASSISTANT

## 2022-04-12 PROCEDURE — 3008F PR BODY MASS INDEX (BMI) DOCUMENTED: ICD-10-PCS | Mod: CPTII,S$GLB,, | Performed by: PHYSICIAN ASSISTANT

## 2022-04-12 PROCEDURE — 99999 PR PBB SHADOW E&M-EST. PATIENT-LVL II: ICD-10-PCS | Mod: PBBFAC,,, | Performed by: PHYSICIAN ASSISTANT

## 2022-04-12 PROCEDURE — 3078F DIAST BP <80 MM HG: CPT | Mod: CPTII,S$GLB,, | Performed by: PHYSICIAN ASSISTANT

## 2022-04-12 PROCEDURE — 3008F BODY MASS INDEX DOCD: CPT | Mod: CPTII,S$GLB,, | Performed by: PHYSICIAN ASSISTANT

## 2022-04-12 RX ORDER — DEXTROAMPHETAMINE SACCHARATE, AMPHETAMINE ASPARTATE MONOHYDRATE, DEXTROAMPHETAMINE SULFATE AND AMPHETAMINE SULFATE 5; 5; 5; 5 MG/1; MG/1; MG/1; MG/1
20 CAPSULE, EXTENDED RELEASE ORAL EVERY MORNING
Qty: 30 CAPSULE | Refills: 0 | Status: SHIPPED | OUTPATIENT
Start: 2022-04-12 | End: 2022-05-16 | Stop reason: SDUPTHER

## 2022-04-12 NOTE — PROGRESS NOTES
"  Outpatient Psychiatry Follow-Up Visit ([place])    4/12/2022    Clinical Status of Patient:  Outpatient (Ambulatory)    Chief Complaint:  Annalise Birch is a 42 y.o. female who presents today for follow-up of anxiety and attention problems.  Met with patient.     Current Meds  Wellbutrin  mg  Adderall XR 15 mg    Interval History and Content of Current Session:  Interim Events/Subjective Report/Content of Current Session:   Patient last seen 1/19/2022    Patient has a psychiatric history of ADHD    Patient presents for follow up stating she is doing well. She had messaged regarding the potential to increase wellbutrin but decided against it. Today, she states she is feeling good and no longer feels she needs to increase. She is happy with the medication at this time.     The patient states the Adderall XR 15 mg is working fine, but that it isn't as effective as when she first started. She states she is able to "pull it together, but it takes a little effort." She reports that getting herself together and getting out of the house in the morning is "rough but not impossible."    She denies anxiety, stating her anxiety improves with the medication.     She has started exercising again which has increased her hunger.     She is sleeping well.     She denies adverse effects.     Denies SI/HI    Psychotherapy:  · Target symptoms: depression, distractability, lack of focus  · Why chosen therapy is appropriate versus another modality: relevant to diagnosis, evidence based practice  · Outcome monitoring methods: self-report  · Therapeutic intervention type: supportive psychotherapy  · Topics discussed/themes: building skills sets for symptom management, symptom recognition  · The patient's response to the intervention is accepting. The patient's progress toward treatment goals is good.   · Duration of intervention: 5 minutes.    Review of Systems   · PSYCHIATRIC: Pertinant items are noted in the narrative.    Past " Medical, Family and Social History: The patient's past medical, family and social history have been reviewed and updated as appropriate within the electronic medical record - see encounter notes.    Compliance: yes    Side effects: None    Risk Parameters:  Patient reports no suicidal ideation  Patient reports no homicidal ideation  Patient reports no self-injurious behavior  Patient reports no violent behavior    Exam (detailed: at least 9 elements; comprehensive: all 15 elements)   Constitutional  Vitals:  Most recent vital signs, dated less than 90 days prior to this appointment, were reviewed.   Vitals:    04/12/22 0855   BP: (!) 124/58   Pulse: 79   Weight: 50.1 kg (110 lb 9 oz)        General:  unremarkable, age appropriate, casually dressed     Musculoskeletal  Muscle Strength/Tone:  not examined   Gait & Station:  non-ataxic     Psychiatric  Speech:  no latency; no press   Mood & Affect:  steady  congruent and appropriate   Thought Process:  normal and logical   Associations:  intact   Thought Content:  normal, no suicidality, no homicidality, delusions, or paranoia   Insight:  intact   Judgement: behavior is adequate to circumstances   Orientation:  grossly intact   Memory: intact for content of interview   Language: grossly intact   Attention Span & Concentration:  able to focus   Fund of Knowledge:  intact and appropriate to age and level of education     Assessment and Diagnosis   Status/Progress: Based on the examination today, the patient's problem(s) is/are adequately but not ideally controlled.  New problems have not been presented today.   Lack of compliance are not complicating management of the primary condition.  There are no active rule-out diagnoses for this patient at this time.     General Impression: The patient is a 31 year old female with a psychiatric history of depression, anxiety, and ADHD. The patient reports that her symptoms are improved on Wellbutrin 150 mg and Adderall XR 15 mg,  patient would like to try a higher dose.     No diagnosis found.    Intervention/Counseling/Treatment Plan   · Medication Management: The risks and benefits of medication were discussed with the patient.   · Continue Wellbutrin 150 mg daily  · Increase from Adderall XR 10 mg to 15 mg.   Discussed with patient informed consent, risks vs. benefits, alternative treatments, side effect profile and the inherent unpredictability of individual responses to these treatments. The patient expresses understanding of the above and displays the capacity to agree with this current plan and had no other questions.  Encouraged patient to keep future appointments.   Encouraged patient to message or call with questions or concerns  Safety plan reviewed with patient for worsening condition or suicidal ideations. In the event of an emergency patient was advised to go to the emergency room.      Return to Clinic: 2 months, 3 months

## 2022-05-16 ENCOUNTER — PATIENT MESSAGE (OUTPATIENT)
Dept: PSYCHIATRY | Facility: CLINIC | Age: 42
End: 2022-05-16
Payer: COMMERCIAL

## 2022-05-16 RX ORDER — DEXTROAMPHETAMINE SACCHARATE, AMPHETAMINE ASPARTATE MONOHYDRATE, DEXTROAMPHETAMINE SULFATE AND AMPHETAMINE SULFATE 5; 5; 5; 5 MG/1; MG/1; MG/1; MG/1
20 CAPSULE, EXTENDED RELEASE ORAL EVERY MORNING
Qty: 30 CAPSULE | Refills: 0 | Status: SHIPPED | OUTPATIENT
Start: 2022-05-16 | End: 2022-06-27 | Stop reason: SDUPTHER

## 2022-06-27 DIAGNOSIS — F90.2 ADHD (ATTENTION DEFICIT HYPERACTIVITY DISORDER), COMBINED TYPE: Primary | ICD-10-CM

## 2022-06-27 RX ORDER — DEXTROAMPHETAMINE SACCHARATE, AMPHETAMINE ASPARTATE MONOHYDRATE, DEXTROAMPHETAMINE SULFATE AND AMPHETAMINE SULFATE 5; 5; 5; 5 MG/1; MG/1; MG/1; MG/1
20 CAPSULE, EXTENDED RELEASE ORAL EVERY MORNING
Qty: 30 CAPSULE | Refills: 0 | Status: SHIPPED | OUTPATIENT
Start: 2022-06-27 | End: 2022-07-13 | Stop reason: SDUPTHER

## 2022-07-13 ENCOUNTER — OFFICE VISIT (OUTPATIENT)
Dept: PSYCHIATRY | Facility: CLINIC | Age: 42
End: 2022-07-13
Payer: COMMERCIAL

## 2022-07-13 VITALS
SYSTOLIC BLOOD PRESSURE: 132 MMHG | DIASTOLIC BLOOD PRESSURE: 58 MMHG | WEIGHT: 113.31 LBS | HEART RATE: 101 BPM | BODY MASS INDEX: 22.13 KG/M2

## 2022-07-13 DIAGNOSIS — F33.1 MODERATE EPISODE OF RECURRENT MAJOR DEPRESSIVE DISORDER: ICD-10-CM

## 2022-07-13 DIAGNOSIS — F90.2 ADHD (ATTENTION DEFICIT HYPERACTIVITY DISORDER), COMBINED TYPE: ICD-10-CM

## 2022-07-13 PROCEDURE — 99213 OFFICE O/P EST LOW 20 MIN: CPT | Mod: S$GLB,,, | Performed by: PHYSICIAN ASSISTANT

## 2022-07-13 PROCEDURE — 3075F SYST BP GE 130 - 139MM HG: CPT | Mod: CPTII,S$GLB,, | Performed by: PHYSICIAN ASSISTANT

## 2022-07-13 PROCEDURE — 1159F MED LIST DOCD IN RCRD: CPT | Mod: CPTII,S$GLB,, | Performed by: PHYSICIAN ASSISTANT

## 2022-07-13 PROCEDURE — 3008F BODY MASS INDEX DOCD: CPT | Mod: CPTII,S$GLB,, | Performed by: PHYSICIAN ASSISTANT

## 2022-07-13 PROCEDURE — 3008F PR BODY MASS INDEX (BMI) DOCUMENTED: ICD-10-PCS | Mod: CPTII,S$GLB,, | Performed by: PHYSICIAN ASSISTANT

## 2022-07-13 PROCEDURE — 99999 PR PBB SHADOW E&M-EST. PATIENT-LVL III: ICD-10-PCS | Mod: PBBFAC,,, | Performed by: PHYSICIAN ASSISTANT

## 2022-07-13 PROCEDURE — 3078F PR MOST RECENT DIASTOLIC BLOOD PRESSURE < 80 MM HG: ICD-10-PCS | Mod: CPTII,S$GLB,, | Performed by: PHYSICIAN ASSISTANT

## 2022-07-13 PROCEDURE — 1160F PR REVIEW ALL MEDS BY PRESCRIBER/CLIN PHARMACIST DOCUMENTED: ICD-10-PCS | Mod: CPTII,S$GLB,, | Performed by: PHYSICIAN ASSISTANT

## 2022-07-13 PROCEDURE — 1160F RVW MEDS BY RX/DR IN RCRD: CPT | Mod: CPTII,S$GLB,, | Performed by: PHYSICIAN ASSISTANT

## 2022-07-13 PROCEDURE — 99999 PR PBB SHADOW E&M-EST. PATIENT-LVL III: CPT | Mod: PBBFAC,,, | Performed by: PHYSICIAN ASSISTANT

## 2022-07-13 PROCEDURE — 99213 PR OFFICE/OUTPT VISIT, EST, LEVL III, 20-29 MIN: ICD-10-PCS | Mod: S$GLB,,, | Performed by: PHYSICIAN ASSISTANT

## 2022-07-13 PROCEDURE — 1159F PR MEDICATION LIST DOCUMENTED IN MEDICAL RECORD: ICD-10-PCS | Mod: CPTII,S$GLB,, | Performed by: PHYSICIAN ASSISTANT

## 2022-07-13 PROCEDURE — 3075F PR MOST RECENT SYSTOLIC BLOOD PRESS GE 130-139MM HG: ICD-10-PCS | Mod: CPTII,S$GLB,, | Performed by: PHYSICIAN ASSISTANT

## 2022-07-13 PROCEDURE — 3078F DIAST BP <80 MM HG: CPT | Mod: CPTII,S$GLB,, | Performed by: PHYSICIAN ASSISTANT

## 2022-07-13 RX ORDER — DEXTROAMPHETAMINE SACCHARATE, AMPHETAMINE ASPARTATE MONOHYDRATE, DEXTROAMPHETAMINE SULFATE AND AMPHETAMINE SULFATE 5; 5; 5; 5 MG/1; MG/1; MG/1; MG/1
20 CAPSULE, EXTENDED RELEASE ORAL EVERY MORNING
Qty: 30 CAPSULE | Refills: 0 | Status: SHIPPED | OUTPATIENT
Start: 2022-07-13 | End: 2022-10-10 | Stop reason: SDUPTHER

## 2022-07-13 RX ORDER — DEXTROAMPHETAMINE SACCHARATE, AMPHETAMINE ASPARTATE MONOHYDRATE, DEXTROAMPHETAMINE SULFATE AND AMPHETAMINE SULFATE 5; 5; 5; 5 MG/1; MG/1; MG/1; MG/1
20 CAPSULE, EXTENDED RELEASE ORAL EVERY MORNING
Qty: 30 CAPSULE | Refills: 0 | Status: SHIPPED | OUTPATIENT
Start: 2022-08-13 | End: 2022-10-10 | Stop reason: SDUPTHER

## 2022-07-13 RX ORDER — DEXTROAMPHETAMINE SACCHARATE, AMPHETAMINE ASPARTATE MONOHYDRATE, DEXTROAMPHETAMINE SULFATE AND AMPHETAMINE SULFATE 5; 5; 5; 5 MG/1; MG/1; MG/1; MG/1
20 CAPSULE, EXTENDED RELEASE ORAL EVERY MORNING
Qty: 30 CAPSULE | Refills: 0 | Status: SHIPPED | OUTPATIENT
Start: 2022-09-13 | End: 2022-09-07 | Stop reason: SDUPTHER

## 2022-07-13 RX ORDER — BUPROPION HYDROCHLORIDE 150 MG/1
150 TABLET ORAL DAILY
Qty: 90 TABLET | Refills: 1 | Status: SHIPPED | OUTPATIENT
Start: 2022-07-13 | End: 2022-10-10 | Stop reason: SDUPTHER

## 2022-07-13 NOTE — PROGRESS NOTES
"    Outpatient Psychiatry Follow-Up Visit ([place])    7/13/2022    Clinical Status of Patient:  Outpatient (Ambulatory)    Chief Complaint:  Annalise Birch is a 42 y.o. female who presents today for follow-up of anxiety and attention problems.  Met with patient.     Current Meds  Wellbutrin  mg  Adderall XR 20 mg    Interval History and Content of Current Session:  Interim Events/Subjective Report/Content of Current Session:   Patient last seen 4/12/2022    Patient has a psychiatric history of ADHD    Patient presents to follow up after increasing to Adderall XR 20 mg.     She reports improvement on the higher dose. She states "work is back to the level of effort it should be." She states she is not having to redirect herself as much and when she does, it is easier.     She reports increased anxiety, some tachycardia, and decreased sleep the first few days but states this has resolved. She denies tachycardia, palpitations, or chest pain at this time. She denies increased anxiety due to the medication.     The patient describes her mood as "happy" and reports the depression she experienced over the winter has resolved. She is content with Wellbutrin 150 mg.     She reports "moments of anxiety" in day to day but states this is not interfering with her life.     She continues to endorse some decreased apeptite with adderall but states she has figured out how to manage this. She has gained some weight back.   She is sleeping well, about 7 hours a night.     She denies SI/HI.       Psychotherapy:  · Target symptoms: depression, distractability, lack of focus  · Why chosen therapy is appropriate versus another modality: relevant to diagnosis, evidence based practice  · Outcome monitoring methods: self-report  · Therapeutic intervention type: supportive psychotherapy  · Topics discussed/themes: symptom recognition  · The patient's response to the intervention is accepting. The patient's progress toward treatment goals is " good.   · Duration of intervention: 6 minutes.    Review of Systems   · PSYCHIATRIC: Pertinant items are noted in the narrative.    Past Medical, Family and Social History: The patient's past medical, family and social history have been reviewed and updated as appropriate within the electronic medical record - see encounter notes.    Compliance: yes    Side effects: None    Risk Parameters:  Patient reports no suicidal ideation  Patient reports no homicidal ideation  Patient reports no self-injurious behavior  Patient reports no violent behavior    Exam (detailed: at least 9 elements; comprehensive: all 15 elements)   Constitutional  Vitals:  Most recent vital signs, dated less than 90 days prior to this appointment, were reviewed.   Vitals:    07/13/22 0847   BP: (!) 132/58   Pulse: 101   Weight: 51.4 kg (113 lb 5.1 oz)        General:  unremarkable, age appropriate, casually dressed     Musculoskeletal  Muscle Strength/Tone:  not examined   Gait & Station:  non-ataxic     Psychiatric  Speech:  no latency; no press   Mood & Affect:  steady, euthymic  congruent and appropriate   Thought Process:  normal and logical   Associations:  intact   Thought Content:  normal, no suicidality, no homicidality, delusions, or paranoia   Insight:  intact   Judgement: behavior is adequate to circumstances   Orientation:  grossly intact   Memory: intact for content of interview   Language: grossly intact   Attention Span & Concentration:  able to focus   Fund of Knowledge:  intact and appropriate to age and level of education     Assessment and Diagnosis   Status/Progress: Based on the examination today, the patient's problem(s) is/are improved.  New problems have not been presented today.   Lack of compliance are not complicating management of the primary condition.  There are no active rule-out diagnoses for this patient at this time.     General Impression: The patient is a 42 year old female with a psychiatric history of  depression, anxiety, and ADHD. The patient reports that her symptoms are improved on Wellbutrin 150 mg and Adderall XR 20 mg      ICD-10-CM ICD-9-CM   1. Moderate episode of recurrent major depressive disorder  F33.1 296.32   2. ADHD (attention deficit hyperactivity disorder), combined type  F90.2 314.01       Intervention/Counseling/Treatment Plan   · Medication Management: The risks and benefits of medication were discussed with the patient.   · Continue Wellbutrin 150 mg daily  · Continue Adderall XR 20 mg  · EKG ordered  Discussed with patient informed consent, risks vs. benefits, alternative treatments, side effect profile and the inherent unpredictability of individual responses to these treatments. The patient expresses understanding of the above and displays the capacity to agree with this current plan and had no other questions.  Encouraged patient to keep future appointments.   Encouraged patient to message or call with questions or concerns  Safety plan reviewed with patient for worsening condition or suicidal ideations. In the event of an emergency patient was advised to go to the emergency room.      Return to Clinic: 2 months, 3 months

## 2022-09-05 ENCOUNTER — PATIENT MESSAGE (OUTPATIENT)
Dept: PSYCHIATRY | Facility: CLINIC | Age: 42
End: 2022-09-05
Payer: COMMERCIAL

## 2022-09-07 DIAGNOSIS — F90.2 ADHD (ATTENTION DEFICIT HYPERACTIVITY DISORDER), COMBINED TYPE: ICD-10-CM

## 2022-09-07 RX ORDER — DEXTROAMPHETAMINE SACCHARATE, AMPHETAMINE ASPARTATE MONOHYDRATE, DEXTROAMPHETAMINE SULFATE AND AMPHETAMINE SULFATE 5; 5; 5; 5 MG/1; MG/1; MG/1; MG/1
20 CAPSULE, EXTENDED RELEASE ORAL EVERY MORNING
Qty: 30 CAPSULE | Refills: 0 | Status: SHIPPED | OUTPATIENT
Start: 2022-09-13 | End: 2022-09-13 | Stop reason: SDUPTHER

## 2022-09-13 DIAGNOSIS — F90.2 ADHD (ATTENTION DEFICIT HYPERACTIVITY DISORDER), COMBINED TYPE: ICD-10-CM

## 2022-09-13 RX ORDER — DEXTROAMPHETAMINE SACCHARATE, AMPHETAMINE ASPARTATE MONOHYDRATE, DEXTROAMPHETAMINE SULFATE AND AMPHETAMINE SULFATE 5; 5; 5; 5 MG/1; MG/1; MG/1; MG/1
20 CAPSULE, EXTENDED RELEASE ORAL EVERY MORNING
Qty: 30 CAPSULE | Refills: 0 | Status: SHIPPED | OUTPATIENT
Start: 2022-09-13 | End: 2022-10-10 | Stop reason: SDUPTHER

## 2022-09-30 ENCOUNTER — HOSPITAL ENCOUNTER (OUTPATIENT)
Dept: CARDIOLOGY | Facility: CLINIC | Age: 42
Discharge: HOME | End: 2022-09-30
Payer: COMMERCIAL

## 2022-09-30 DIAGNOSIS — F90.2 ADHD (ATTENTION DEFICIT HYPERACTIVITY DISORDER), COMBINED TYPE: ICD-10-CM

## 2022-09-30 PROCEDURE — 93010 EKG 12-LEAD: ICD-10-PCS | Mod: S$GLB,,, | Performed by: INTERNAL MEDICINE

## 2022-09-30 PROCEDURE — 93010 ELECTROCARDIOGRAM REPORT: CPT | Mod: S$GLB,,, | Performed by: INTERNAL MEDICINE

## 2022-09-30 PROCEDURE — 93005 ELECTROCARDIOGRAM TRACING: CPT | Mod: S$GLB,,, | Performed by: PHYSICIAN ASSISTANT

## 2022-09-30 PROCEDURE — 93005 EKG 12-LEAD: ICD-10-PCS | Mod: S$GLB,,, | Performed by: PHYSICIAN ASSISTANT

## 2022-10-10 ENCOUNTER — OFFICE VISIT (OUTPATIENT)
Dept: PSYCHIATRY | Facility: CLINIC | Age: 42
End: 2022-10-10
Payer: COMMERCIAL

## 2022-10-10 VITALS
HEART RATE: 64 BPM | BODY MASS INDEX: 23.08 KG/M2 | DIASTOLIC BLOOD PRESSURE: 59 MMHG | WEIGHT: 118.19 LBS | SYSTOLIC BLOOD PRESSURE: 108 MMHG

## 2022-10-10 DIAGNOSIS — F90.2 ADHD (ATTENTION DEFICIT HYPERACTIVITY DISORDER), COMBINED TYPE: ICD-10-CM

## 2022-10-10 DIAGNOSIS — F33.1 MODERATE EPISODE OF RECURRENT MAJOR DEPRESSIVE DISORDER: ICD-10-CM

## 2022-10-10 PROCEDURE — 1159F PR MEDICATION LIST DOCUMENTED IN MEDICAL RECORD: ICD-10-PCS | Mod: CPTII,S$GLB,, | Performed by: PHYSICIAN ASSISTANT

## 2022-10-10 PROCEDURE — 1160F PR REVIEW ALL MEDS BY PRESCRIBER/CLIN PHARMACIST DOCUMENTED: ICD-10-PCS | Mod: CPTII,S$GLB,, | Performed by: PHYSICIAN ASSISTANT

## 2022-10-10 PROCEDURE — 1159F MED LIST DOCD IN RCRD: CPT | Mod: CPTII,S$GLB,, | Performed by: PHYSICIAN ASSISTANT

## 2022-10-10 PROCEDURE — 99999 PR PBB SHADOW E&M-EST. PATIENT-LVL II: ICD-10-PCS | Mod: PBBFAC,,, | Performed by: PHYSICIAN ASSISTANT

## 2022-10-10 PROCEDURE — 99213 OFFICE O/P EST LOW 20 MIN: CPT | Mod: S$GLB,,, | Performed by: PHYSICIAN ASSISTANT

## 2022-10-10 PROCEDURE — 99999 PR PBB SHADOW E&M-EST. PATIENT-LVL II: CPT | Mod: PBBFAC,,, | Performed by: PHYSICIAN ASSISTANT

## 2022-10-10 PROCEDURE — 99213 PR OFFICE/OUTPT VISIT, EST, LEVL III, 20-29 MIN: ICD-10-PCS | Mod: S$GLB,,, | Performed by: PHYSICIAN ASSISTANT

## 2022-10-10 PROCEDURE — 1160F RVW MEDS BY RX/DR IN RCRD: CPT | Mod: CPTII,S$GLB,, | Performed by: PHYSICIAN ASSISTANT

## 2022-10-10 RX ORDER — DEXTROAMPHETAMINE SACCHARATE, AMPHETAMINE ASPARTATE MONOHYDRATE, DEXTROAMPHETAMINE SULFATE AND AMPHETAMINE SULFATE 5; 5; 5; 5 MG/1; MG/1; MG/1; MG/1
20 CAPSULE, EXTENDED RELEASE ORAL EVERY MORNING
Qty: 30 CAPSULE | Refills: 0 | Status: SHIPPED | OUTPATIENT
Start: 2022-12-10 | End: 2023-01-10 | Stop reason: SDUPTHER

## 2022-10-10 RX ORDER — DEXTROAMPHETAMINE SACCHARATE, AMPHETAMINE ASPARTATE MONOHYDRATE, DEXTROAMPHETAMINE SULFATE AND AMPHETAMINE SULFATE 5; 5; 5; 5 MG/1; MG/1; MG/1; MG/1
20 CAPSULE, EXTENDED RELEASE ORAL EVERY MORNING
Qty: 30 CAPSULE | Refills: 0 | Status: SHIPPED | OUTPATIENT
Start: 2022-11-10 | End: 2023-01-10 | Stop reason: SDUPTHER

## 2022-10-10 RX ORDER — DEXTROAMPHETAMINE SACCHARATE, AMPHETAMINE ASPARTATE MONOHYDRATE, DEXTROAMPHETAMINE SULFATE AND AMPHETAMINE SULFATE 5; 5; 5; 5 MG/1; MG/1; MG/1; MG/1
20 CAPSULE, EXTENDED RELEASE ORAL EVERY MORNING
Qty: 30 CAPSULE | Refills: 0 | Status: SHIPPED | OUTPATIENT
Start: 2022-10-10 | End: 2023-01-10 | Stop reason: SDUPTHER

## 2022-10-10 RX ORDER — BUPROPION HYDROCHLORIDE 150 MG/1
150 TABLET ORAL DAILY
Qty: 90 TABLET | Refills: 1 | Status: SHIPPED | OUTPATIENT
Start: 2022-10-10 | End: 2023-01-10 | Stop reason: SDUPTHER

## 2022-10-10 NOTE — PROGRESS NOTES
Outpatient Psychiatry Follow-Up Visit (PA)    10/10/2022    Clinical Status of Patient:  Outpatient (Ambulatory)    Chief Complaint:  Annalise Birch is a 42 y.o. female who presents today for follow-up of anxiety and attention problems.  Met with patient.     Current Meds  Wellbutrin  mg  Adderall XR 20 mg    Interval History and Content of Current Session:  Interim Events/Subjective Report/Content of Current Session:   Patient last seen 4/12/2022    Patient has a psychiatric history of ADHD    Patient presents to follow today reporting she is doing well.     Patient states she is good.   Her mood has been fine.   She denies any anxiety lately.     Patient is happy with wellbutrin 150 mg.     Patient is happy with Adderall XR 20 mg.   Patient denies any adverse effects.     She is sleeping well.   She experiences decreased appetite but makes sure she eats. She denies any weight loss.       Psychotherapy:  Target symptoms: depression, distractability, lack of focus  Why chosen therapy is appropriate versus another modality: relevant to diagnosis, evidence based practice  Outcome monitoring methods: self-report  Therapeutic intervention type: supportive psychotherapy  Topics discussed/themes: symptom recognition  The patient's response to the intervention is accepting. The patient's progress toward treatment goals is good.   Duration of intervention: 5 minutes.    Review of Systems   PSYCHIATRIC: Pertinant items are noted in the narrative.    Past Medical, Family and Social History: The patient's past medical, family and social history have been reviewed and updated as appropriate within the electronic medical record - see encounter notes.    Compliance: yes    Side effects: None    Risk Parameters:  Patient reports no suicidal ideation  Patient reports no homicidal ideation  Patient reports no self-injurious behavior  Patient reports no violent behavior    Exam (detailed: at least 9 elements; comprehensive:  Cardiology all 15 elements)   Constitutional  Vitals:  Most recent vital signs, dated less than 90 days prior to this appointment, were reviewed.   There were no vitals filed for this visit.       General:  unremarkable, age appropriate, casually dressed     Musculoskeletal  Muscle Strength/Tone:  not examined   Gait & Station:  non-ataxic     Psychiatric  Speech:  no latency; no press   Mood & Affect:  steady, euthymic  congruent and appropriate   Thought Process:  normal and logical   Associations:  intact   Thought Content:  normal, no suicidality, no homicidality, delusions, or paranoia   Insight:  intact   Judgement: behavior is adequate to circumstances   Orientation:  grossly intact   Memory: intact for content of interview   Language: grossly intact   Attention Span & Concentration:  able to focus   Fund of Knowledge:  intact and appropriate to age and level of education     Assessment and Diagnosis   Status/Progress: Based on the examination today, the patient's problem(s) is/are improved.  New problems have not been presented today.   Lack of compliance are not complicating management of the primary condition.  There are no active rule-out diagnoses for this patient at this time.     General Impression: The patient is a 42 year old female with a psychiatric history of depression, anxiety, and ADHD. The patient reports that her symptoms are improved on Wellbutrin 150 mg and Adderall XR 20 mg      ICD-10-CM ICD-9-CM   1. Moderate episode of recurrent major depressive disorder  F33.1 296.32   2. ADHD (attention deficit hyperactivity disorder), combined type  F90.2 314.01         Intervention/Counseling/Treatment Plan   Medication Management: The risks and benefits of medication were discussed with the patient.   Continue Wellbutrin 150 mg daily  Continue Adderall XR 20 mg  Discussed with patient informed consent, risks vs. benefits, alternative treatments, side effect profile and the inherent unpredictability of  Internal Medicine individual responses to these treatments. The patient expresses understanding of the above and displays the capacity to agree with this current plan and had no other questions.  Encouraged patient to keep future appointments.   Encouraged patient to message or call with questions or concerns  Safety plan reviewed with patient for worsening condition or suicidal ideations. In the event of an emergency patient was advised to go to the emergency room.      Return to Clinic: 3 months     Internal Medicine Surgery

## 2023-01-10 ENCOUNTER — OFFICE VISIT (OUTPATIENT)
Dept: PSYCHIATRY | Facility: CLINIC | Age: 43
End: 2023-01-10
Payer: COMMERCIAL

## 2023-01-10 VITALS
HEART RATE: 80 BPM | SYSTOLIC BLOOD PRESSURE: 112 MMHG | WEIGHT: 116.63 LBS | DIASTOLIC BLOOD PRESSURE: 71 MMHG | BODY MASS INDEX: 22.78 KG/M2

## 2023-01-10 DIAGNOSIS — F90.2 ADHD (ATTENTION DEFICIT HYPERACTIVITY DISORDER), COMBINED TYPE: ICD-10-CM

## 2023-01-10 DIAGNOSIS — F33.1 MODERATE EPISODE OF RECURRENT MAJOR DEPRESSIVE DISORDER: ICD-10-CM

## 2023-01-10 PROCEDURE — 1159F MED LIST DOCD IN RCRD: CPT | Mod: CPTII,S$GLB,, | Performed by: PHYSICIAN ASSISTANT

## 2023-01-10 PROCEDURE — 99213 PR OFFICE/OUTPT VISIT, EST, LEVL III, 20-29 MIN: ICD-10-PCS | Mod: S$GLB,,, | Performed by: PHYSICIAN ASSISTANT

## 2023-01-10 PROCEDURE — 1159F PR MEDICATION LIST DOCUMENTED IN MEDICAL RECORD: ICD-10-PCS | Mod: CPTII,S$GLB,, | Performed by: PHYSICIAN ASSISTANT

## 2023-01-10 PROCEDURE — 99999 PR PBB SHADOW E&M-EST. PATIENT-LVL II: ICD-10-PCS | Mod: PBBFAC,,, | Performed by: PHYSICIAN ASSISTANT

## 2023-01-10 PROCEDURE — 99213 OFFICE O/P EST LOW 20 MIN: CPT | Mod: S$GLB,,, | Performed by: PHYSICIAN ASSISTANT

## 2023-01-10 PROCEDURE — 99999 PR PBB SHADOW E&M-EST. PATIENT-LVL II: CPT | Mod: PBBFAC,,, | Performed by: PHYSICIAN ASSISTANT

## 2023-01-10 PROCEDURE — 1160F PR REVIEW ALL MEDS BY PRESCRIBER/CLIN PHARMACIST DOCUMENTED: ICD-10-PCS | Mod: CPTII,S$GLB,, | Performed by: PHYSICIAN ASSISTANT

## 2023-01-10 PROCEDURE — 1160F RVW MEDS BY RX/DR IN RCRD: CPT | Mod: CPTII,S$GLB,, | Performed by: PHYSICIAN ASSISTANT

## 2023-01-10 RX ORDER — DEXTROAMPHETAMINE SACCHARATE, AMPHETAMINE ASPARTATE MONOHYDRATE, DEXTROAMPHETAMINE SULFATE AND AMPHETAMINE SULFATE 5; 5; 5; 5 MG/1; MG/1; MG/1; MG/1
20 CAPSULE, EXTENDED RELEASE ORAL EVERY MORNING
Qty: 30 CAPSULE | Refills: 0 | Status: SHIPPED | OUTPATIENT
Start: 2023-01-10 | End: 2023-04-14 | Stop reason: SDUPTHER

## 2023-01-10 RX ORDER — DEXTROAMPHETAMINE SACCHARATE, AMPHETAMINE ASPARTATE MONOHYDRATE, DEXTROAMPHETAMINE SULFATE AND AMPHETAMINE SULFATE 5; 5; 5; 5 MG/1; MG/1; MG/1; MG/1
20 CAPSULE, EXTENDED RELEASE ORAL EVERY MORNING
Qty: 30 CAPSULE | Refills: 0 | Status: SHIPPED | OUTPATIENT
Start: 2023-03-10 | End: 2023-04-14 | Stop reason: SDUPTHER

## 2023-01-10 RX ORDER — DEXTROAMPHETAMINE SACCHARATE, AMPHETAMINE ASPARTATE MONOHYDRATE, DEXTROAMPHETAMINE SULFATE AND AMPHETAMINE SULFATE 5; 5; 5; 5 MG/1; MG/1; MG/1; MG/1
20 CAPSULE, EXTENDED RELEASE ORAL EVERY MORNING
Qty: 30 CAPSULE | Refills: 0 | Status: SHIPPED | OUTPATIENT
Start: 2023-02-10 | End: 2023-04-14 | Stop reason: SDUPTHER

## 2023-01-10 RX ORDER — BUPROPION HYDROCHLORIDE 150 MG/1
150 TABLET ORAL DAILY
Qty: 90 TABLET | Refills: 1 | Status: SHIPPED | OUTPATIENT
Start: 2023-01-10 | End: 2023-04-14 | Stop reason: SDUPTHER

## 2023-01-10 NOTE — PROGRESS NOTES
"  Outpatient Psychiatry Follow-Up Visit ([place])    1/10/2023    Clinical Status of Patient:  Outpatient (Ambulatory)    Chief Complaint:  Annalise Birch is a 42 y.o. female who presents today for follow-up of anxiety and attention problems.  Met with patient.     Current Meds  Wellbutrin  mg  Adderall XR 20 mg    Interval History and Content of Current Session:  Interim Events/Subjective Report/Content of Current Session:   Patient last seen 10/10/2022    Patient has a psychiatric history of ADHD    Patient presents to follow today reporting she is doing well.     Patient states medications are going well, "everything is the same."    Her mood is "good, steady."  She reports some anxiety around holidays, since resolved.     Work is good, she got through busy season.     Patient denies adverse effects. Appetite has normalized.     She is sleeping wells.       Psychotherapy:  Target symptoms: distractability, lack of focus  Why chosen therapy is appropriate versus another modality: relevant to diagnosis, evidence based practice  Outcome monitoring methods: self-report  Therapeutic intervention type: supportive psychotherapy  Topics discussed/themes: symptom recognition  The patient's response to the intervention is accepting. The patient's progress toward treatment goals is good.   Duration of intervention: 5 minutes.    Review of Systems   PSYCHIATRIC: Pertinant items are noted in the narrative.    Past Medical, Family and Social History: The patient's past medical, family and social history have been reviewed and updated as appropriate within the electronic medical record - see encounter notes.    Compliance: yes    Side effects: None    Risk Parameters:  Patient reports no suicidal ideation  Patient reports no homicidal ideation  Patient reports no self-injurious behavior  Patient reports no violent behavior    Exam (detailed: at least 9 elements; comprehensive: all 15 elements)   Constitutional  Vitals:  Most " recent vital signs, dated less than 90 days prior to this appointment, were reviewed.   There were no vitals filed for this visit.       General:  unremarkable, age appropriate, casually dressed     Musculoskeletal  Muscle Strength/Tone:  not examined   Gait & Station:  non-ataxic     Psychiatric  Speech:  no latency; no press   Mood & Affect:  steady, euthymic  congruent and appropriate   Thought Process:  normal and logical   Associations:  intact   Thought Content:  normal, no suicidality, no homicidality, delusions, or paranoia   Insight:  intact   Judgement: behavior is adequate to circumstances   Orientation:  grossly intact   Memory: intact for content of interview   Language: grossly intact   Attention Span & Concentration:  able to focus   Fund of Knowledge:  intact and appropriate to age and level of education     Assessment and Diagnosis   Status/Progress: Based on the examination today, the patient's problem(s) is/are improved.  New problems have not been presented today.   Lack of compliance are not complicating management of the primary condition.  There are no active rule-out diagnoses for this patient at this time.     General Impression: The patient is a 42 year old female with a psychiatric history of depression, anxiety, and ADHD. The patient reports that her symptoms are improved on Wellbutrin 150 mg and Adderall XR 20 mg      ICD-10-CM ICD-9-CM   1. Moderate episode of recurrent major depressive disorder  F33.1 296.32   2. ADHD (attention deficit hyperactivity disorder), combined type  F90.2 314.01           Intervention/Counseling/Treatment Plan   Medication Management: The risks and benefits of medication were discussed with the patient.   Continue Wellbutrin 150 mg daily  Continue Adderall XR 20 mg  Discussed with patient informed consent, risks vs. benefits, alternative treatments, side effect profile and the inherent unpredictability of individual responses to these treatments. The patient  expresses understanding of the above and displays the capacity to agree with this current plan and had no other questions.  Encouraged patient to keep future appointments.   Encouraged patient to message or call with questions or concerns  Safety plan reviewed with patient for worsening condition or suicidal ideations. In the event of an emergency patient was advised to go to the emergency room.      Return to Clinic: 3 months

## 2023-04-14 ENCOUNTER — OFFICE VISIT (OUTPATIENT)
Dept: PSYCHIATRY | Facility: CLINIC | Age: 43
End: 2023-04-14
Payer: COMMERCIAL

## 2023-04-14 DIAGNOSIS — F90.2 ADHD (ATTENTION DEFICIT HYPERACTIVITY DISORDER), COMBINED TYPE: ICD-10-CM

## 2023-04-14 DIAGNOSIS — F33.1 MODERATE EPISODE OF RECURRENT MAJOR DEPRESSIVE DISORDER: ICD-10-CM

## 2023-04-14 PROCEDURE — 99214 PR OFFICE/OUTPT VISIT, EST, LEVL IV, 30-39 MIN: ICD-10-PCS | Mod: 95,,, | Performed by: PHYSICIAN ASSISTANT

## 2023-04-14 PROCEDURE — 1159F PR MEDICATION LIST DOCUMENTED IN MEDICAL RECORD: ICD-10-PCS | Mod: CPTII,95,, | Performed by: PHYSICIAN ASSISTANT

## 2023-04-14 PROCEDURE — 99214 OFFICE O/P EST MOD 30 MIN: CPT | Mod: 95,,, | Performed by: PHYSICIAN ASSISTANT

## 2023-04-14 PROCEDURE — 1160F RVW MEDS BY RX/DR IN RCRD: CPT | Mod: CPTII,95,, | Performed by: PHYSICIAN ASSISTANT

## 2023-04-14 PROCEDURE — 1159F MED LIST DOCD IN RCRD: CPT | Mod: CPTII,95,, | Performed by: PHYSICIAN ASSISTANT

## 2023-04-14 PROCEDURE — 1160F PR REVIEW ALL MEDS BY PRESCRIBER/CLIN PHARMACIST DOCUMENTED: ICD-10-PCS | Mod: CPTII,95,, | Performed by: PHYSICIAN ASSISTANT

## 2023-04-14 RX ORDER — BUPROPION HYDROCHLORIDE 150 MG/1
150 TABLET ORAL DAILY
Qty: 90 TABLET | Refills: 1 | Status: SHIPPED | OUTPATIENT
Start: 2023-04-14 | End: 2023-07-13 | Stop reason: SDUPTHER

## 2023-04-14 RX ORDER — DEXTROAMPHETAMINE SACCHARATE, AMPHETAMINE ASPARTATE MONOHYDRATE, DEXTROAMPHETAMINE SULFATE AND AMPHETAMINE SULFATE 5; 5; 5; 5 MG/1; MG/1; MG/1; MG/1
20 CAPSULE, EXTENDED RELEASE ORAL EVERY MORNING
Qty: 30 CAPSULE | Refills: 0 | Status: SHIPPED | OUTPATIENT
Start: 2023-06-14 | End: 2023-07-13 | Stop reason: SDUPTHER

## 2023-04-14 RX ORDER — DEXTROAMPHETAMINE SACCHARATE, AMPHETAMINE ASPARTATE MONOHYDRATE, DEXTROAMPHETAMINE SULFATE AND AMPHETAMINE SULFATE 5; 5; 5; 5 MG/1; MG/1; MG/1; MG/1
20 CAPSULE, EXTENDED RELEASE ORAL EVERY MORNING
Qty: 30 CAPSULE | Refills: 0 | Status: SHIPPED | OUTPATIENT
Start: 2023-05-14 | End: 2023-05-08 | Stop reason: SDUPTHER

## 2023-04-14 RX ORDER — DEXTROAMPHETAMINE SACCHARATE, AMPHETAMINE ASPARTATE MONOHYDRATE, DEXTROAMPHETAMINE SULFATE AND AMPHETAMINE SULFATE 5; 5; 5; 5 MG/1; MG/1; MG/1; MG/1
20 CAPSULE, EXTENDED RELEASE ORAL EVERY MORNING
Qty: 30 CAPSULE | Refills: 0 | Status: SHIPPED | OUTPATIENT
Start: 2023-04-14 | End: 2023-05-08 | Stop reason: SDUPTHER

## 2023-04-14 NOTE — PROGRESS NOTES
"The patient location is: [place], [place]  The chief complaint leading to consultation is: f/u    Visit type: audiovisual    Face to Face time with patient: 8  12 minutes of total time spent on the encounter, which includes face to face time and non-face to face time preparing to see the patient (eg, review of tests), Obtaining and/or reviewing separately obtained history, Documenting clinical information in the electronic or other health record, Independently interpreting results (not separately reported) and communicating results to the patient/family/caregiver, or Care coordination (not separately reported).         Each patient to whom he or she provides medical services by telemedicine is:  (1) informed of the relationship between the physician and patient and the respective role of any other health care provider with respect to management of the patient; and (2) notified that he or she may decline to receive medical services by telemedicine and may withdraw from such care at any time.    Notes:     Outpatient Psychiatry Follow-Up Visit (PA)    4/14/2023    Clinical Status of Patient:  Outpatient (Ambulatory)    Chief Complaint:  Annalise Birch is a 43 y.o. female who presents today for follow-up of anxiety and attention problems.  Met with patient.     Current Meds  Wellbutrin  mg  Adderall XR 20 mg    Interval History and Content of Current Session:  Interim Events/Subjective Report/Content of Current Session:   Patient last seen 1/10/2023    Patient has a psychiatric history of ADHD    Patient presents to follow today stating she is doing well.   Patient states "every thing is still going well."  She reports medications is going- 20 mg working well.    She denies depression.  She denies anxiety.    Adderall is working well, helps with focus, concentration, paperwork at work.   She is taking M-F    She reports decreased appetite but is still eating. Her weight has steadied out.   She denies chest pain, " t  Pt is sleeping well.           Psychotherapy:  Target symptoms: distractability, lack of focus  Why chosen therapy is appropriate versus another modality: relevant to diagnosis, evidence based practice  Outcome monitoring methods: self-report  Therapeutic intervention type: supportive psychotherapy  Topics discussed/themes: symptom recognition  The patient's response to the intervention is accepting. The patient's progress toward treatment goals is good.   Duration of intervention: 5 minutes.    Review of Systems   PSYCHIATRIC: Pertinant items are noted in the narrative.    Past Medical, Family and Social History: The patient's past medical, family and social history have been reviewed and updated as appropriate within the electronic medical record - see encounter notes.    Compliance: yes    Side effects: None    Risk Parameters:  Patient reports no suicidal ideation  Patient reports no homicidal ideation  Patient reports no self-injurious behavior  Patient reports no violent behavior    Exam (detailed: at least 9 elements; comprehensive: all 15 elements)   Constitutional  Vitals:  Most recent vital signs, dated less than 90 days prior to this appointment, were reviewed.   There were no vitals filed for this visit.       General:  unremarkable, age appropriate, casually dressed     Musculoskeletal  Muscle Strength/Tone:  not examined   Gait & Station:  virtual     Psychiatric  Speech:  no latency; no press   Mood & Affect:  steady, euthymic  congruent and appropriate   Thought Process:  normal and logical   Associations:  intact   Thought Content:  normal, no suicidality, no homicidality, delusions, or paranoia   Insight:  intact   Judgement: behavior is adequate to circumstances   Orientation:  grossly intact   Memory: intact for content of interview   Language: grossly intact   Attention Span & Concentration:  able to focus   Fund of Knowledge:  intact and appropriate to age and level of education      Assessment and Diagnosis   Status/Progress: Based on the examination today, the patient's problem(s) is/are improved.  New problems have not been presented today.   Lack of compliance are not complicating management of the primary condition.  There are no active rule-out diagnoses for this patient at this time.     General Impression: The patient is a 43 year old female with a psychiatric history of depression, anxiety, and ADHD. The patient reports that her symptoms are improved on Wellbutrin 150 mg and Adderall XR 20 mg      ICD-10-CM ICD-9-CM   1. Moderate episode of recurrent major depressive disorder  F33.1 296.32   2. ADHD (attention deficit hyperactivity disorder), combined type  F90.2 314.01             Intervention/Counseling/Treatment Plan   Medication Management: The risks and benefits of medication were discussed with the patient.   Continue Wellbutrin 150 mg daily  Continue Adderall XR 20 mg  Discussed with patient informed consent, risks vs. benefits, alternative treatments, side effect profile and the inherent unpredictability of individual responses to these treatments. The patient expresses understanding of the above and displays the capacity to agree with this current plan and had no other questions.  Encouraged patient to keep future appointments.   Encouraged patient to message or call with questions or concerns  Safety plan reviewed with patient for worsening condition or suicidal ideations. In the event of an emergency patient was advised to go to the emergency room.      Return to Clinic: 3 months

## 2023-04-18 ENCOUNTER — TELEPHONE (OUTPATIENT)
Dept: OBSTETRICS AND GYNECOLOGY | Facility: CLINIC | Age: 43
End: 2023-04-18
Payer: COMMERCIAL

## 2023-04-18 DIAGNOSIS — Z12.31 BREAST CANCER SCREENING BY MAMMOGRAM: Primary | ICD-10-CM

## 2023-05-03 ENCOUNTER — HOSPITAL ENCOUNTER (OUTPATIENT)
Dept: RADIOLOGY | Facility: HOSPITAL | Age: 43
Discharge: HOME | End: 2023-05-03
Attending: NURSE PRACTITIONER
Payer: COMMERCIAL

## 2023-05-03 VITALS — BODY MASS INDEX: 22.78 KG/M2 | HEIGHT: 60 IN | WEIGHT: 116 LBS

## 2023-05-03 DIAGNOSIS — Z12.31 BREAST CANCER SCREENING BY MAMMOGRAM: ICD-10-CM

## 2023-05-03 PROCEDURE — 77063 BREAST TOMOSYNTHESIS BI: CPT | Mod: 26,,, | Performed by: RADIOLOGY

## 2023-05-03 PROCEDURE — 77067 SCR MAMMO BI INCL CAD: CPT | Mod: 26,,, | Performed by: RADIOLOGY

## 2023-05-03 PROCEDURE — 77063 MAMMO DIGITAL SCREENING BILAT WITH TOMO: ICD-10-PCS | Mod: 26,,, | Performed by: RADIOLOGY

## 2023-05-03 PROCEDURE — 77067 MAMMO DIGITAL SCREENING BILAT WITH TOMO: ICD-10-PCS | Mod: 26,,, | Performed by: RADIOLOGY

## 2023-05-03 PROCEDURE — 77067 SCR MAMMO BI INCL CAD: CPT | Mod: TC

## 2023-05-08 ENCOUNTER — OFFICE VISIT (OUTPATIENT)
Dept: GASTROENTEROLOGY | Facility: CLINIC | Age: 43
End: 2023-05-08
Payer: COMMERCIAL

## 2023-05-08 VITALS
WEIGHT: 121 LBS | SYSTOLIC BLOOD PRESSURE: 112 MMHG | BODY MASS INDEX: 23.75 KG/M2 | DIASTOLIC BLOOD PRESSURE: 71 MMHG | HEART RATE: 73 BPM | HEIGHT: 60 IN

## 2023-05-08 DIAGNOSIS — R68.81 EARLY SATIETY: Primary | ICD-10-CM

## 2023-05-08 DIAGNOSIS — R11.0 NAUSEA: ICD-10-CM

## 2023-05-08 DIAGNOSIS — R10.9 ABDOMINAL CRAMPING: ICD-10-CM

## 2023-05-08 PROCEDURE — 99214 PR OFFICE/OUTPT VISIT, EST, LEVL IV, 30-39 MIN: ICD-10-PCS | Mod: S$GLB,,,

## 2023-05-08 PROCEDURE — 1159F MED LIST DOCD IN RCRD: CPT | Mod: CPTII,S$GLB,,

## 2023-05-08 PROCEDURE — 1159F PR MEDICATION LIST DOCUMENTED IN MEDICAL RECORD: ICD-10-PCS | Mod: CPTII,S$GLB,,

## 2023-05-08 PROCEDURE — 99999 PR PBB SHADOW E&M-EST. PATIENT-LVL III: CPT | Mod: PBBFAC,,,

## 2023-05-08 PROCEDURE — 3008F PR BODY MASS INDEX (BMI) DOCUMENTED: ICD-10-PCS | Mod: CPTII,S$GLB,,

## 2023-05-08 PROCEDURE — 99214 OFFICE O/P EST MOD 30 MIN: CPT | Mod: S$GLB,,,

## 2023-05-08 PROCEDURE — 99999 PR PBB SHADOW E&M-EST. PATIENT-LVL III: ICD-10-PCS | Mod: PBBFAC,,,

## 2023-05-08 PROCEDURE — 3074F SYST BP LT 130 MM HG: CPT | Mod: CPTII,S$GLB,,

## 2023-05-08 PROCEDURE — 3078F PR MOST RECENT DIASTOLIC BLOOD PRESSURE < 80 MM HG: ICD-10-PCS | Mod: CPTII,S$GLB,,

## 2023-05-08 PROCEDURE — 3078F DIAST BP <80 MM HG: CPT | Mod: CPTII,S$GLB,,

## 2023-05-08 PROCEDURE — 3008F BODY MASS INDEX DOCD: CPT | Mod: CPTII,S$GLB,,

## 2023-05-08 PROCEDURE — 3074F PR MOST RECENT SYSTOLIC BLOOD PRESSURE < 130 MM HG: ICD-10-PCS | Mod: CPTII,S$GLB,,

## 2023-05-08 RX ORDER — ONDANSETRON 4 MG/1
4 TABLET, ORALLY DISINTEGRATING ORAL EVERY 8 HOURS PRN
Qty: 30 TABLET | Refills: 1 | Status: SHIPPED | OUTPATIENT
Start: 2023-05-08 | End: 2023-06-07

## 2023-05-08 RX ORDER — DICYCLOMINE HYDROCHLORIDE 10 MG/1
10 CAPSULE ORAL
Qty: 120 CAPSULE | Refills: 0 | Status: SHIPPED | OUTPATIENT
Start: 2023-05-08 | End: 2023-06-07

## 2023-05-08 NOTE — PROGRESS NOTES
"                                                                           Gastroenterology Clinic Consultation Note    Reason for Visit:  The primary encounter diagnosis was Early satiety. Diagnoses of Abdominal cramping and Nausea were also pertinent to this visit.    PCP:   Primary Doctor No         Initial HPI   This is a 43 y.o. female presenting for ongoing abdominal cramping with associated early satiety with intermittent nausea. Reports that she has had these symptoms for several years. "Episodes" will occur about 1-2x monthly. Patient reports that she often feels as if her food is not digesting and staying in her stomach. She will get full faster and have no subsequent appetite. Follows a gluten free diet. Has associated periumbilical abdominal cramping and nausea without vomiting. Reports that this symptom occurs occasionally. Able to manage her symptoms by juicing for a day or two and symptoms resolve. Denies any hematemesis, hematochezia, constipation, unintentional weight loss. Describes her stools as soft, but has a BM daily.     LOV with Dr. Sultana on 7/20/2021 for chronic constipation and IBS like symptoms. LInzess started but never picked up because of cost.      ROS:  Review of Systems   Constitutional:  Negative for chills, diaphoresis, fever, malaise/fatigue and weight loss.   HENT:  Negative for sore throat.    Eyes:  Negative for redness.   Gastrointestinal:  Positive for abdominal pain and nausea. Negative for blood in stool, constipation, diarrhea, heartburn, melena and vomiting.   Skin:  Negative for itching and rash.   Neurological:  Negative for dizziness, loss of consciousness and weakness.      Medical History:  has no past medical history on file.    Surgical History:  has no past surgical history on file.    Family History: family history includes Alzheimer's disease in her paternal grandmother; Breast cancer in her maternal grandmother; Breast cancer (age of onset: 73) in her mother; " Hypertension in her brother, father, and mother; Lymphoma in her paternal grandfather; Prostate cancer in her father..       Review of patient's allergies indicates:   Allergen Reactions    Gluten protein Itching, Nausea And Vomiting and Swelling       Current Outpatient Medications on File Prior to Visit   Medication Sig Dispense Refill    buPROPion (WELLBUTRIN XL) 150 MG TB24 tablet Take 1 tablet (150 mg total) by mouth once daily. 90 tablet 1    dextroamphetamine-amphetamine (ADDERALL XR) 20 MG 24 hr capsule Take 1 capsule (20 mg total) by mouth every morning. 30 capsule 0    [START ON 5/14/2023] dextroamphetamine-amphetamine (ADDERALL XR) 20 MG 24 hr capsule Take 1 capsule (20 mg total) by mouth every morning. 30 capsule 0    [START ON 6/14/2023] dextroamphetamine-amphetamine (ADDERALL XR) 20 MG 24 hr capsule Take 1 capsule (20 mg total) by mouth every morning. 30 capsule 0     No current facility-administered medications on file prior to visit.         Objective Findings:    Vital Signs:  /71   Pulse 73   Ht 5' (1.524 m)   Wt 54.9 kg (121 lb)   BMI 23.63 kg/m²   Body mass index is 23.63 kg/m².    Physical Exam:  Physical Exam  Vitals reviewed.   Constitutional:       General: She is not in acute distress.     Appearance: She is normal weight. She is not ill-appearing.   HENT:      Mouth/Throat:      Mouth: Mucous membranes are moist.      Pharynx: Oropharynx is clear.   Eyes:      General: No scleral icterus.  Abdominal:      General: Abdomen is flat. Bowel sounds are normal. There is no distension.      Palpations: Abdomen is soft. There is no mass.      Tenderness: There is no abdominal tenderness.      Hernia: No hernia is present.   Skin:     General: Skin is warm and dry.      Capillary Refill: Capillary refill takes less than 2 seconds.      Coloration: Skin is not jaundiced or pale.      Findings: No bruising or erythema.   Neurological:      Mental Status: She is alert and oriented to person,  place, and time. Mental status is at baseline.           Labs:  Lab Results   Component Value Date    CRP 0.2 07/20/2021       Imaging reviewed: No pertinent imaging reviewed.      Endoscopy reviewed: No prior endoscopy performed      Assessment:  1. Early satiety    2. Abdominal cramping    3. Nausea             Plan:  Will obtain Gastric Emptying study. If abnormal, can consider diet education and/or Reglan trial. This seems to be a multi-year history of GI issues. Can consider EGD for further investigation and biopsies pending gastric emptying study.  Prescribed Bentyl 10mg PO with meals as needed for abdominal spasms.  Zofran 4 mg PRN nausea given for patients nausea.  RTC pending workup.      Thank you for allowing me to participate in this patient's care.    Sincerely,     Iqra Clay NP  Gastroenterology Department  [facility]

## 2023-05-15 ENCOUNTER — TELEPHONE (OUTPATIENT)
Dept: OBSTETRICS AND GYNECOLOGY | Facility: CLINIC | Age: 43
End: 2023-05-15
Payer: COMMERCIAL

## 2023-05-15 ENCOUNTER — PATIENT MESSAGE (OUTPATIENT)
Dept: OBSTETRICS AND GYNECOLOGY | Facility: CLINIC | Age: 43
End: 2023-05-15
Payer: COMMERCIAL

## 2023-05-15 NOTE — TELEPHONE ENCOUNTER
Called patient to schedule her for her annual voicemail came on, on the second ring left voicemail

## 2023-05-18 ENCOUNTER — TELEPHONE (OUTPATIENT)
Dept: OBSTETRICS AND GYNECOLOGY | Facility: CLINIC | Age: 43
End: 2023-05-18
Payer: COMMERCIAL

## 2023-06-22 ENCOUNTER — OFFICE VISIT (OUTPATIENT)
Dept: OBSTETRICS AND GYNECOLOGY | Facility: CLINIC | Age: 43
End: 2023-06-22
Payer: COMMERCIAL

## 2023-06-22 VITALS
WEIGHT: 120.69 LBS | SYSTOLIC BLOOD PRESSURE: 110 MMHG | HEIGHT: 60 IN | BODY MASS INDEX: 23.69 KG/M2 | DIASTOLIC BLOOD PRESSURE: 62 MMHG

## 2023-06-22 DIAGNOSIS — Z80.3 FAMILY HISTORY OF BREAST CANCER: ICD-10-CM

## 2023-06-22 DIAGNOSIS — Z01.419 WOMEN'S ANNUAL ROUTINE GYNECOLOGICAL EXAMINATION: Primary | ICD-10-CM

## 2023-06-22 DIAGNOSIS — Z91.89 AT HIGH RISK FOR BREAST CANCER: ICD-10-CM

## 2023-06-22 PROCEDURE — 3078F DIAST BP <80 MM HG: CPT | Mod: CPTII,S$GLB,, | Performed by: NURSE PRACTITIONER

## 2023-06-22 PROCEDURE — 1160F RVW MEDS BY RX/DR IN RCRD: CPT | Mod: CPTII,S$GLB,, | Performed by: NURSE PRACTITIONER

## 2023-06-22 PROCEDURE — 3008F BODY MASS INDEX DOCD: CPT | Mod: CPTII,S$GLB,, | Performed by: NURSE PRACTITIONER

## 2023-06-22 PROCEDURE — 1160F PR REVIEW ALL MEDS BY PRESCRIBER/CLIN PHARMACIST DOCUMENTED: ICD-10-PCS | Mod: CPTII,S$GLB,, | Performed by: NURSE PRACTITIONER

## 2023-06-22 PROCEDURE — 3008F PR BODY MASS INDEX (BMI) DOCUMENTED: ICD-10-PCS | Mod: CPTII,S$GLB,, | Performed by: NURSE PRACTITIONER

## 2023-06-22 PROCEDURE — 3074F SYST BP LT 130 MM HG: CPT | Mod: CPTII,S$GLB,, | Performed by: NURSE PRACTITIONER

## 2023-06-22 PROCEDURE — 99999 PR PBB SHADOW E&M-EST. PATIENT-LVL IV: CPT | Mod: PBBFAC,,, | Performed by: NURSE PRACTITIONER

## 2023-06-22 PROCEDURE — 1159F MED LIST DOCD IN RCRD: CPT | Mod: CPTII,S$GLB,, | Performed by: NURSE PRACTITIONER

## 2023-06-22 PROCEDURE — 1159F PR MEDICATION LIST DOCUMENTED IN MEDICAL RECORD: ICD-10-PCS | Mod: CPTII,S$GLB,, | Performed by: NURSE PRACTITIONER

## 2023-06-22 PROCEDURE — 3074F PR MOST RECENT SYSTOLIC BLOOD PRESSURE < 130 MM HG: ICD-10-PCS | Mod: CPTII,S$GLB,, | Performed by: NURSE PRACTITIONER

## 2023-06-22 PROCEDURE — 3078F PR MOST RECENT DIASTOLIC BLOOD PRESSURE < 80 MM HG: ICD-10-PCS | Mod: CPTII,S$GLB,, | Performed by: NURSE PRACTITIONER

## 2023-06-22 PROCEDURE — 99396 PREV VISIT EST AGE 40-64: CPT | Mod: S$GLB,,, | Performed by: NURSE PRACTITIONER

## 2023-06-22 PROCEDURE — 99999 PR PBB SHADOW E&M-EST. PATIENT-LVL IV: ICD-10-PCS | Mod: PBBFAC,,, | Performed by: NURSE PRACTITIONER

## 2023-06-22 PROCEDURE — 99396 PR PREVENTIVE VISIT,EST,40-64: ICD-10-PCS | Mod: S$GLB,,, | Performed by: NURSE PRACTITIONER

## 2023-06-22 NOTE — PROGRESS NOTES
CC: Annual  HPI: Pt is a 43 y.o.  female who presents for routine annual exam. She does not want STD screening.  Denies any GYN complaints.  The patient participates in regular exercise: Yes.  The patient does not smoke.  The patient wears seatbelts.   Pt denies any domestic violence.    Last pap in 2021 with normal result. Recent mammogram in May 2023 with high TC score noted, mother and grandmother with history of breast cancer. Grandmother diagnosed prior to age 50. Mother was recently diagnosed at age 73.     FH:  Breast cancer: mother and maternal grandmother  Colon cancer: none  Ovarian cancer: none  Endometrial cancer: none    ROS:  GENERAL: Feeling well overall. Denies fever or chills.   SKIN: Denies rash or lesions.   HEAD: Denies head injury or headache.   NODES: Denies enlarged lymph nodes.   CHEST: Denies chest pain or shortness of breath.   CARDIOVASCULAR: Denies palpitations or left sided chest pain.   ABDOMEN: No abdominal pain, constipation, diarrhea, nausea, vomiting or rectal bleeding.   URINARY: No dysuria, hematuria, or burning on urination.  REPRODUCTIVE: See HPI.   BREASTS: Denies pain, lumps, or nipple discharge.   HEMATOLOGIC: No easy bruisability or excessive bleeding.   MUSCULOSKELETAL: Denies joint pain or swelling.   NEUROLOGIC: Denies syncope or weakness.   PSYCHIATRIC: Denies depression, anxiety or mood swings.    PE:   APPEARANCE: Well nourished, well developed, White female in no acute distress.  NODES: no cervical, supraclavicular, or inguinal lymphadenopathy  BREASTS: Symmetrical, no skin changes or visible lesions. No palpable masses, nipple discharge or adenopathy bilaterally.  ABDOMEN: Soft. No tenderness or masses. No distention. No hernias palpated. No CVA tenderness.  VULVA: No lesions. Normal external female genitalia.  URETHRAL MEATUS: Normal size and location, no lesions, no prolapse.  URETHRA: No masses, tenderness, or prolapse.  VAGINA: Moist. No lesions or lacerations  noted. No abnormal discharge present. No odor present.   CERVIX: No lesions or discharge. No cervical motion tenderness.   UTERUS: Normal size, regular shape, mobile, non-tender.  ADNEXA: No tenderness. No fullness or masses palpated in the adnexal regions.   ANUS PERINEUM: Normal.      Diagnosis:  1. Women's annual routine gynecological examination    2. Family history of breast cancer    3. At high risk for breast cancer        Plan:     Orders Placed This Encounter    Ambulatory referral/consult to Genetics    Ambulatory referral/consult to Breast Surgery     Referral to breast [place] for increase screening, referral to cancer genetics.    Patient was counseled today on the new ACS guidelines for cervical cytology screening as well as the current recommendations for breast cancer screening. She was counseled to follow up with her PCP for other routine health maintenance. Counseling session lasted approximately 10 minutes, and all her questions were answered.    Follow-up with me in 1 year for routine exam       GABRIEL Crocker

## 2023-06-27 PROBLEM — Z91.89 AT HIGH RISK FOR BREAST CANCER: Status: ACTIVE | Noted: 2023-06-27

## 2023-07-13 ENCOUNTER — OFFICE VISIT (OUTPATIENT)
Dept: PSYCHIATRY | Facility: CLINIC | Age: 43
End: 2023-07-13
Payer: COMMERCIAL

## 2023-07-13 DIAGNOSIS — F33.1 MODERATE EPISODE OF RECURRENT MAJOR DEPRESSIVE DISORDER: ICD-10-CM

## 2023-07-13 DIAGNOSIS — F90.2 ADHD (ATTENTION DEFICIT HYPERACTIVITY DISORDER), COMBINED TYPE: ICD-10-CM

## 2023-07-13 PROCEDURE — 1160F RVW MEDS BY RX/DR IN RCRD: CPT | Mod: CPTII,95,, | Performed by: PHYSICIAN ASSISTANT

## 2023-07-13 PROCEDURE — 99213 PR OFFICE/OUTPT VISIT, EST, LEVL III, 20-29 MIN: ICD-10-PCS | Mod: 95,,, | Performed by: PHYSICIAN ASSISTANT

## 2023-07-13 PROCEDURE — 1159F MED LIST DOCD IN RCRD: CPT | Mod: CPTII,95,, | Performed by: PHYSICIAN ASSISTANT

## 2023-07-13 PROCEDURE — 1160F PR REVIEW ALL MEDS BY PRESCRIBER/CLIN PHARMACIST DOCUMENTED: ICD-10-PCS | Mod: CPTII,95,, | Performed by: PHYSICIAN ASSISTANT

## 2023-07-13 PROCEDURE — 1159F PR MEDICATION LIST DOCUMENTED IN MEDICAL RECORD: ICD-10-PCS | Mod: CPTII,95,, | Performed by: PHYSICIAN ASSISTANT

## 2023-07-13 PROCEDURE — 99213 OFFICE O/P EST LOW 20 MIN: CPT | Mod: 95,,, | Performed by: PHYSICIAN ASSISTANT

## 2023-07-13 RX ORDER — DEXTROAMPHETAMINE SACCHARATE, AMPHETAMINE ASPARTATE MONOHYDRATE, DEXTROAMPHETAMINE SULFATE AND AMPHETAMINE SULFATE 5; 5; 5; 5 MG/1; MG/1; MG/1; MG/1
20 CAPSULE, EXTENDED RELEASE ORAL EVERY MORNING
Qty: 30 CAPSULE | Refills: 0 | Status: SHIPPED | OUTPATIENT
Start: 2023-07-13

## 2023-07-13 RX ORDER — DEXTROAMPHETAMINE SACCHARATE, AMPHETAMINE ASPARTATE MONOHYDRATE, DEXTROAMPHETAMINE SULFATE AND AMPHETAMINE SULFATE 5; 5; 5; 5 MG/1; MG/1; MG/1; MG/1
20 CAPSULE, EXTENDED RELEASE ORAL EVERY MORNING
Qty: 30 CAPSULE | Refills: 0 | Status: SHIPPED | OUTPATIENT
Start: 2023-08-13

## 2023-07-13 RX ORDER — DEXTROAMPHETAMINE SACCHARATE, AMPHETAMINE ASPARTATE MONOHYDRATE, DEXTROAMPHETAMINE SULFATE AND AMPHETAMINE SULFATE 5; 5; 5; 5 MG/1; MG/1; MG/1; MG/1
20 CAPSULE, EXTENDED RELEASE ORAL EVERY MORNING
Qty: 30 CAPSULE | Refills: 0 | Status: SHIPPED | OUTPATIENT
Start: 2023-09-13

## 2023-07-13 RX ORDER — BUPROPION HYDROCHLORIDE 150 MG/1
150 TABLET ORAL DAILY
Qty: 90 TABLET | Refills: 1 | Status: SHIPPED | OUTPATIENT
Start: 2023-07-13

## 2023-07-13 NOTE — PROGRESS NOTES
"The patient location is: [place], [place]  The chief complaint leading to consultation is: f/u    Visit type: audiovisual    Face to Face time with patient: 5  10 minutes of total time spent on the encounter, which includes face to face time and non-face to face time preparing to see the patient (eg, review of tests), Obtaining and/or reviewing separately obtained history, Documenting clinical information in the electronic or other health record, Independently interpreting results (not separately reported) and communicating results to the patient/family/caregiver, or Care coordination (not separately reported).         Each patient to whom he or she provides medical services by telemedicine is:  (1) informed of the relationship between the physician and patient and the respective role of any other health care provider with respect to management of the patient; and (2) notified that he or she may decline to receive medical services by telemedicine and may withdraw from such care at any time.    Notes:     Outpatient Psychiatry Follow-Up Visit (PA)    7/13/2023    Clinical Status of Patient:  Outpatient (Ambulatory)    Chief Complaint:  Annalise Birch is a 43 y.o. female who presents today for follow-up of anxiety and attention problems.  Met with patient.     Current Meds  Wellbutrin  mg  Adderall XR 20 mg    Interval History and Content of Current Session:  Interim Events/Subjective Report/Content of Current Session:   Patient last seen 4/14/2023    Patient has a psychiatric history of ADHD    Patient presents to follow today stating she is good.     Patient states "everything is good."  Her mood has been good.   She denies depression or anxiety.     She reports Adderall is working well  She denies ASE.     She is sleeping and eating.     Pt is doing well.       Psychotherapy:  Target symptoms: distractability, lack of focus  Why chosen therapy is appropriate versus another modality: relevant to diagnosis, " evidence based practice  Outcome monitoring methods: self-report  Therapeutic intervention type: supportive psychotherapy  Topics discussed/themes: symptom recognition  The patient's response to the intervention is accepting. The patient's progress toward treatment goals is excellent.   Duration of intervention: 5 minutes.    Review of Systems   PSYCHIATRIC: Pertinant items are noted in the narrative.    Past Medical, Family and Social History: The patient's past medical, family and social history have been reviewed and updated as appropriate within the electronic medical record - see encounter notes.    Compliance: yes    Side effects: None    Risk Parameters:  Patient reports no suicidal ideation  Patient reports no homicidal ideation  Patient reports no self-injurious behavior  Patient reports no violent behavior    Exam (detailed: at least 9 elements; comprehensive: all 15 elements)   Constitutional  Vitals:  Most recent vital signs, dated less than 90 days prior to this appointment, were reviewed.   There were no vitals filed for this visit.       General:  unremarkable, age appropriate, casually dressed     Musculoskeletal  Muscle Strength/Tone:  not examined   Gait & Station:  virtual     Psychiatric  Speech:  no latency; no press   Mood & Affect:  steady, euthymic  congruent and appropriate   Thought Process:  normal and logical   Associations:  intact   Thought Content:  normal, no suicidality, no homicidality, delusions, or paranoia   Insight:  intact   Judgement: behavior is adequate to circumstances   Orientation:  grossly intact   Memory: intact for content of interview   Language: grossly intact   Attention Span & Concentration:  able to focus   Fund of Knowledge:  intact and appropriate to age and level of education     Assessment and Diagnosis   Status/Progress: Based on the examination today, the patient's problem(s) is/are improved.  New problems have not been presented today.   Lack of compliance  are not complicating management of the primary condition.  There are no active rule-out diagnoses for this patient at this time.     General Impression: The patient is a 43 year old female with a psychiatric history of depression, anxiety, and ADHD. The patient reports that her symptoms are improved on Wellbutrin 150 mg and Adderall XR 20 mg      ICD-10-CM ICD-9-CM   1. Moderate episode of recurrent major depressive disorder  F33.1 296.32   2. ADHD (attention deficit hyperactivity disorder), combined type  F90.2 314.01               Intervention/Counseling/Treatment Plan   Medication Management: The risks and benefits of medication were discussed with the patient.   Continue Wellbutrin 150 mg daily  Continue Adderall XR 20 mg  Discussed with patient informed consent, risks vs. benefits, alternative treatments, side effect profile and the inherent unpredictability of individual responses to these treatments. The patient expresses understanding of the above and displays the capacity to agree with this current plan and had no other questions.  Encouraged patient to keep future appointments.   Encouraged patient to message or call with questions or concerns  Safety plan reviewed with patient for worsening condition or suicidal ideations. In the event of an emergency patient was advised to go to the emergency room.      Return to Clinic: 3 months

## 2023-07-13 NOTE — PROGRESS NOTES
"Cancer Genetics  Hereditary/High Risk Clinic  Department of Hematology and Oncology  [facility]    Initial Consult    Date of Service:  23  Visit Provider:  Carole Mohan PA-C  Collaborating Physician:  Della Brown MD    Patient:  Annalise Birch  :  1980  MRN:   [id number]     Referring Provider    Agustina Flores, NP  [id number]  [address]  [place] [address]    SUBJECTIVE   Chief Complaint: Genetic evaluation  History of Present Illness (HPI):  Annalise Birch ("Annalise"), 43 y.o., assigned female sex at birth, is new to the [facility] Department of H[facility]ology and On[facility] and to me.  She presents for genetic cancer risk assessment given her family history of breast cancer in her mother (72) and maternal grandmother (40s). Her grandmother did not have genetic testing and her mother does not want genetic testing.     Genetic Assessment History  Germline cancer-genetic testing: no  Personal history of cancer:  no  Benign tumors:  no  Colon polyps: No history of colonoscopy  Pancreatitis:  no  Chemoprevention: Never used  Uterus and ovaries intact:  yes    Breast-Specific History  Age: 43 years  Height:  5' 0"  Weight:  115 pounds  Breast density per BI-RADS:  extremely dense  Age at menarche: 15 years  Age at first live birth: N/A  Menopause:  premenopausal  HRT usage:  never  BRCA testing:  No prior testing   Personal history of ovarian cancer:  No  Personal history of breast biopsy:  no  Ashkenazi Latter-day inheritance:  No  History of XRT to chest wall:  No  Family history:  as detailed in pedigree/family history    Breast Cancer Risk Stratification   Current, Estimated Breast Cancer Risk Model Used Patient's Score Risk for general population Patient's Risk Category   5-year Gill Model 1.2% 0.7%  [] N/A given age <35   [x] Average risk (<1.7%)   [] Increased risk (?1.7%)   10-year Tyrer-Cuzick v8.0b 5.6% 1.8%  [] <5%   [x] ?5%    Lifetime (to age 85) Tyrer-Cuzick v8.0b 30.8% " 10.7%  [] Average risk (<15%)   [] Intermediate risk (?15% - <20%)   [x] High risk (?20%)   According to the American Cancer Society, patients with a lifetime breast cancer risk of 20% or higher might benefit from supplemental screening exams. Women with a 5-year risk greater than or equal to 1.7% may benefit from chemoprevention agents (tamoxifen, raloxifene, aromatase inhibitors) to reduce risk.    No past medical history on file.  Patient Active Problem List    Diagnosis Date Noted    At high risk for breast cancer 06/27/2023      Family History  Germline cancer-genetic testing in blood relatives:  No  Ashkenazi Oriental orthodox ancestry: No  Consanguinity in ancestors:  No  No known history of cancer of colorectal polyps in extended family other than noted below.     ** If the pedigree is small/illegible, expand this note window horizontally to view the pedigree in a larger format. **      Family History   Problem Relation Age of Onset    Skin cancer Mother     Hypertension Mother     Breast cancer Mother 72    Prostate cancer Father 70        RT    Hypertension Father     Breast cancer Maternal Grandmother 45    Alzheimer's disease Paternal Grandmother     Cancer Paternal Grandfather         histocytoma suspected to be from factor work    Hypertension Brother     Colon cancer Neg Hx     Esophageal cancer Neg Hx     Ovarian cancer Neg Hx       Review of Systems  See HPI.    Pain 0/10  Recent falls: None   Patient's Distress Score today was 3/10 (with 10 being the worst).       OBJECTIVE   Physical Exam  Very pleasant patient.  Unaccompanied  Vitals signs:  There were no vitals filed for this visit.   Constitutional: No apparent distress.   Pulmonary: Normal effort  Neurological: Alert and oriented. No obvious neurological deficits.   Psychiatric: Normal mood, affect, thought content, speech, behavior, judgment.  Genetics-specific: It is my assessment that the patient is ready to proceed with cancer-genetic testing from a  "psychosocial perspective.  COUNSELING     What is cancer?  Cancer cells have gene mutations that turned the cell from normal to abnormal. Normal cells grow and divide in an orderly fashion, work in harmony with other cells in the body, and die at a specific time. Abnormal cells grow and divide uncontrollably, invade nearby tissues, interfere with the function of normal cells, and trick the immune system so they won't be killed.     How does this happen?  Your DNA is like a book of instructions that directs what traits you have and how your body functions. Each gene contains DNA "letters" that spell out the instructions to make a specific protein. A mutation can insert, delete, rearrange, or substitute letters, which alters the instructions. In some cases, the mutation has no effect and the gene still produces the correct protein. In other cases, the gene produces a dysfunctional protein or no protein at all, both of which can lead to a cascade of problems.     Tumor suppressor genes:   Specialized genes called tumor suppressor genes control how cells grow and divide. They act like the brakes in a car, slowing or stopping the cell cycle when needed. If tumor suppressor genes are inactivated by mutations, the brakes are disabled, resulting in uncontrolled growth (cancer). Because genes come in pairs (one inherited from each parent), inactivation of one copy will not lead to cancer because the other normal copy is still functional. But if the second copy undergoes mutation, the person may then develop cancer because there is no longer a functional copy of the gene.     How are genes inactivated?  Inherited mutations are passed directly from parent to child. These mutations are found in every cell of the body, including the individual's blood and germ cells (sperm and eggs).  Acquired mutations occur during a person's lifetime. They are only present in the cancer cells and cannot be passed to the individual's children. "   Epigenetic silencing: A rare event in which the gene structure is completely normal, but something in another part of the system inactivates the gene.     Sporadic Cancer:   Approximately 80% of all cancers are sporadic or random. These cancers usually occur after age 60 and are caused by an accumulation of genetic mutations acquired over the course of an individual's lifetime. The risk for acquired mutations increases with age and can be due to environmental factors (chemical and radiation exposures), lifestyle factors (smoking, alcohol, obesity, lack of exercise, poor diet), and certain medical conditions (diabetes, liver disease, viral infections like HPV). Sporadic cancer can also occur as a result of random DNA errors that occur when cells are dividing. This is suspected in young individuals who have no risk factors for cancer, no family history of cancer, and no inherited genetic mutations.     Hereditary cancer  Approximately 5-10% of all cancers are hereditary. These cancers are caused by a mutation the individual inherited from a parent. In some cases, that inherited mutation is enough to cause cancer. In most cases, however, cancer occurs when the individual has the inherited mutation plus multiple acquired mutations. Families with a germline mutation often have multiple members with early-onset cancer (usually before age 50), rare cancers (such as male breast cancer), and/or multiple blood relatives in successive generations with the same or related cancers (cancers associated with the mutated gene).      Familial cancer  Familial cancer refers to a clustering of a particular cancer in a family that is more than you would expect to see by chance, but it is not a hereditary cancer. While hereditary cancer is caused by an inherited mutation in a single gene, familial cancers are caused by a combination of multiple factors, some of which may be inherited (like diabetes or fatty liver disease) and some  related to shared lifestyle and environmental factors. Like sporadic cancer, familial cancers are usually diagnosed at older ages and don't follow the same inheritance patterns seen in hereditary cancers.     Germline testing:   Germline testing analyzes the genes in a blood, saliva, or skin specimen to detected inherited mutations. Possible results are:   Positive: A mutation is present that is known to result in an increased risk for cancer or other disease.      Negative: No cancer-causing mutations are present.   Uncertain: A mutation is present that is of unknown significance. Researchers have been unable to determine if it results in an increased risk for cancer.     Genetic testing logistics:  Standard method: A blood sample is collected at an [facility] lab and sent to an outside genetics lab for testing. Blood specimens can be utilized for DNA and RNA analysis.   Alternate method: A saliva sample is collected by the patient at home and mailed to the genetics lab. Saliva specimens can only be used for DNA analysis.   Alternate method: A skin punch biopsy is collected at [facility] and sent to an outside lab that extracts DNA from the cultured skin fibroblasts that is sent for genetic testing.   Why is this done? Individuals with certain active hematological conditions may have circulating leukocytes containing abnormal somatic variants. The presence of these somatic variants can result in a complete test failure, partial test failure, false negatives, or false positives. Since leukocytes are found in the blood, saliva and buccal specimens, a skin punch is the only way to ensure a specimen that is free of abnormal somatic variants.   Additionally, individuals who are status post allogenic bone marrow transplant, stem cell transplant, or had a bood transfusion <2 weeks prior must also be tested using a skin specimen.     Cost of testing:   Genetic testing is expensive, but is covered by most health insurance  policies with a low out of pocket cost. Additionally, some genetics labs offer a reduced cost for individuals who do not have health insurance or have policies that do not cover genetic testing.     Genetic information discrimination:  Genetic information discrimination occurs when an employer, insurance company, or other entity uses genetic information to make decisions or otherwise discriminate against an individual. Examples would include an insurance refusing to issue a policy because the individual has a genetic mutation or an employer refusing to hire or promote someone because they discovered they have a mutation or family history of cancer. A federal law called STACIE provide some protections for health insurance and employment. Other laws and policies offer additional protections against genetic discrimination.    The Genetic Information Nondiscrimination Act of 2008 (STACIE) is a federal law that expands the protections included in the Health Insurance Portability and Accountability Act of 1996 (HIPAA).  Under Title I of STACIE, group health plans cannot base premiums for a plan or a group of similarly situated individuals on genetic information. STACIE generally prohibits plans from requesting or requiring an individual to undergo genetic tests, and prohibits a plan from collecting genetic information (including family medical history) prior to or in connection with enrollment, or for underwriting purposes.  This rule does not apply to individuals who receive their insurance through the federal government or . Those entities have their own set of rules regarding genetic information.   This rule only applies to health insurance. Other types of insurance (life, disability, long-term care, cancer, etc) are not protected. An individual can be denied coverage or charged a higher premium based on their genetic information.   Under Title II of STACIE, it is illegal to discriminate against employees or applicants  because of genetic information. Title II of STACIE prohibits the use of genetic information in making employment decisions, restricts employers and other entities covered by Title II (employment agencies, labor organizations and joint labor-management training and apprenticeship  STACIE has a small business exemption for employers with less than 15 employees.    ASSESSMENT/PLAN    A cancer-genetic evaluation and pre-test genetic counseling were conducted. Based on the information provided by Annalise, her personal and/or family history is mildly suggestive of a potential hereditary predisposition to cancer and she does meet criteria for testing due to her grandmother's young onset of breast cancer. Her mother would be a more informative candidate for testing, but is not interested. The recommendation is to proceed with germline testing to include the genes commonly associated with hereditary breast cancer (BRYN, BARD1, BRCA1, BRCA2, CDH1, CHEK2, NF1, PALB2, PTEN, RAD51C, RAD51D, STK11, TP53).     Annalise was given the option of proceeding with testing now, deferring testing to a later date, or declining testing and opted to proceed.     Genetics lab: [facility]   Genetic test: [facility] BRCA1/2 core panel + Multi-Cancer +RNA, 84 genes (456291.1)   Verbal informed consent: Obtained   Written informed consent: Obtained   Specimen type: Blood   Specimen collection by: [facility]    Specimen collection date: 7/14/23   Results expected by: Approximately 2-3 weeks after the genetic testing lab receives the specimen   Results disclosure plan: Notification by genetics team and virtual post-test visit.         1. Encounter for nonprocreative genetic counseling    2. Family history of breast cancer  - Genetic Misc Sendout Test, Blood; Future  - Orders placed in [facility] provider portal and pedigree uploaded. [id number]    3. At high risk for breast cancer  - Annalise is seeing Dr. Will for a high risk breast consultation today.      4. Family history of prostate cancer      Follow-up:  Follow up in about 27 days (around 8/10/2023) for results review.     Questions were encouraged and answered to the patient's satisfaction, and she verbalized understanding of information and agreement with the plan.       Approximately 50 minutes were spent face-to-face with the patient.  Approximately 70 minutes in total were spent on this encounter, which includes face-to-face time and non-face-to-face time preparing to see the patient (e.g., review of tests), obtaining and/or reviewing separately obtained history, documenting clinical information in the electronic or other health record, independently interpreting results (not separately reported) and communicating results to the patient/family/caregiver, or care coordination (not separately reported).     REFERENCES     National Comprehensive Cancer Network (NCCN). (2021). Genetic/familial high-risk assessment: Breast, ovarian, and pancreatic. NCCN Clinical Practice Guidelines in Oncology (NCCN Guidelines), Version 1.2022.  National Comprehensive Cancer Network (NCCN). (2021). Prostate cancer. NCCN Clinical Practice Guidelines in Oncology (NCCN Guidelines), Version 1.2022.    Carole Mohan PA-C, MPAS, PA-C  Physician Assistant, Hereditary/High Risk Clinic  Hematology/Oncology, [facility]

## 2023-07-14 ENCOUNTER — OFFICE VISIT (OUTPATIENT)
Dept: HEMATOLOGY/ONCOLOGY | Facility: CLINIC | Age: 43
End: 2023-07-14
Payer: COMMERCIAL

## 2023-07-14 ENCOUNTER — LAB VISIT (OUTPATIENT)
Dept: LAB | Facility: HOSPITAL | Age: 43
End: 2023-07-14
Payer: COMMERCIAL

## 2023-07-14 VITALS
HEART RATE: 61 BPM | WEIGHT: 122.38 LBS | DIASTOLIC BLOOD PRESSURE: 48 MMHG | SYSTOLIC BLOOD PRESSURE: 99 MMHG | TEMPERATURE: 98 F | RESPIRATION RATE: 18 BRPM | BODY MASS INDEX: 24.03 KG/M2 | OXYGEN SATURATION: 99 % | HEIGHT: 60 IN

## 2023-07-14 DIAGNOSIS — Z71.83 ENCOUNTER FOR NONPROCREATIVE GENETIC COUNSELING: Primary | ICD-10-CM

## 2023-07-14 DIAGNOSIS — Z80.3 FAMILY HISTORY OF BREAST CANCER: ICD-10-CM

## 2023-07-14 DIAGNOSIS — Z91.89 AT HIGH RISK FOR BREAST CANCER: ICD-10-CM

## 2023-07-14 DIAGNOSIS — Z80.42 FAMILY HISTORY OF PROSTATE CANCER: ICD-10-CM

## 2023-07-14 PROCEDURE — 1159F PR MEDICATION LIST DOCUMENTED IN MEDICAL RECORD: ICD-10-PCS | Mod: CPTII,S$GLB,, | Performed by: INTERNAL MEDICINE

## 2023-07-14 PROCEDURE — 99999 PR PBB SHADOW E&M-EST. PATIENT-LVL IV: CPT | Mod: PBBFAC,,, | Performed by: INTERNAL MEDICINE

## 2023-07-14 PROCEDURE — 3008F BODY MASS INDEX DOCD: CPT | Mod: CPTII,S$GLB,, | Performed by: INTERNAL MEDICINE

## 2023-07-14 PROCEDURE — 3074F SYST BP LT 130 MM HG: CPT | Mod: CPTII,S$GLB,, | Performed by: INTERNAL MEDICINE

## 2023-07-14 PROCEDURE — 3074F PR MOST RECENT SYSTOLIC BLOOD PRESSURE < 130 MM HG: ICD-10-PCS | Mod: CPTII,S$GLB,, | Performed by: INTERNAL MEDICINE

## 2023-07-14 PROCEDURE — 1160F RVW MEDS BY RX/DR IN RCRD: CPT | Mod: CPTII,S$GLB,, | Performed by: INTERNAL MEDICINE

## 2023-07-14 PROCEDURE — 36415 COLL VENOUS BLD VENIPUNCTURE: CPT | Performed by: PHYSICIAN ASSISTANT

## 2023-07-14 PROCEDURE — 99205 PR OFFICE/OUTPT VISIT, NEW, LEVL V, 60-74 MIN: ICD-10-PCS | Mod: S$GLB,,, | Performed by: INTERNAL MEDICINE

## 2023-07-14 PROCEDURE — 3008F PR BODY MASS INDEX (BMI) DOCUMENTED: ICD-10-PCS | Mod: CPTII,S$GLB,, | Performed by: INTERNAL MEDICINE

## 2023-07-14 PROCEDURE — 99999 PR PBB SHADOW E&M-EST. PATIENT-LVL III: CPT | Mod: PBBFAC,,, | Performed by: PHYSICIAN ASSISTANT

## 2023-07-14 PROCEDURE — 99999 PR PBB SHADOW E&M-EST. PATIENT-LVL III: ICD-10-PCS | Mod: PBBFAC,,, | Performed by: PHYSICIAN ASSISTANT

## 2023-07-14 PROCEDURE — 1160F PR REVIEW ALL MEDS BY PRESCRIBER/CLIN PHARMACIST DOCUMENTED: ICD-10-PCS | Mod: CPTII,S$GLB,, | Performed by: INTERNAL MEDICINE

## 2023-07-14 PROCEDURE — 1159F MED LIST DOCD IN RCRD: CPT | Mod: CPTII,S$GLB,, | Performed by: INTERNAL MEDICINE

## 2023-07-14 PROCEDURE — 99205 OFFICE O/P NEW HI 60 MIN: CPT | Mod: S$GLB,,, | Performed by: PHYSICIAN ASSISTANT

## 2023-07-14 PROCEDURE — 3078F DIAST BP <80 MM HG: CPT | Mod: CPTII,S$GLB,, | Performed by: INTERNAL MEDICINE

## 2023-07-14 PROCEDURE — 99999 PR PBB SHADOW E&M-EST. PATIENT-LVL IV: ICD-10-PCS | Mod: PBBFAC,,, | Performed by: INTERNAL MEDICINE

## 2023-07-14 PROCEDURE — 3078F PR MOST RECENT DIASTOLIC BLOOD PRESSURE < 80 MM HG: ICD-10-PCS | Mod: CPTII,S$GLB,, | Performed by: INTERNAL MEDICINE

## 2023-07-14 PROCEDURE — 99205 PR OFFICE/OUTPT VISIT, NEW, LEVL V, 60-74 MIN: ICD-10-PCS | Mod: S$GLB,,, | Performed by: PHYSICIAN ASSISTANT

## 2023-07-14 PROCEDURE — 99205 OFFICE O/P NEW HI 60 MIN: CPT | Mod: S$GLB,,, | Performed by: INTERNAL MEDICINE

## 2023-07-14 NOTE — PROGRESS NOTES
High Risk For Breast Cancer Clinic - [facility]    Outpatient Medical Oncology Outpatient Note  Patient: Annalise Birch  MRN: [id number]  Primary Care Provider: Primary Doctor No  Referring Provider: Agustina Flores NP  [id number]  [address]  [place] [address]     Chief Complaint: Discuss Lifetime Risk of Breast Cancer - Strong Family History and Extremely Dense Breasts     Subjective     Subjective:   HPI:   Annalise Birch is a 43 y.o. premenopausal female with PMH significant for:   -IBS     She presents for consultation to discuss her lifetime risk of breast cancer as she had her screening mammogram recently which was benign but revealed an elevated Tyrer-Cuzick score. She is here to discuss options for supplemental breast cancer screening imaging and risk reduction strategies.     HPI:  5/3/23: Screening MMG: No suspicious masses, extremely dense breasts, TC: 30%    Interval History:    Today, patient feels well. No breast concerns - specifically no breast masses, nipple changes, or pain.    Patient otherwise denies fevers, chills, night sweats, headaches, chest pain, SOB, cough, abdominal pain, N/V, diarrhea, constipation, weight loss, rashes     Review of Systems:  14-point review of systems was asked with the pertinent positives and/or negatives stated in HPI.     High Risk Breast cancer specific history:  - Age: 43 y.o.   - Height:  5'   - Weight: 115 lbs   - Breast density per BI-RADS:  extremely dense   - Prior Breast Biopsy: No  - Age at menarche:  15  - Number of pregnancies:    - History of breastfeeding: NA  - Age at menopause, if applicable:  NA.  Reports regular menstrual cycles    - Uterus and ovaries intact: Yes  - HRT: No  - OCPs: No  - Genetic testing: No  - Personal history of cancer: No  - Previous chest radiation exposure between ages 10-30 years old: No  - [employer] Inheritance: No  - Race: White       Past Medical History:   No past medical history on  file.  Patient Active Problem List   Diagnosis    At high risk for breast cancer     Past Surgical HIstory:   No past surgical history on file.    Family History:   - Mother: Breast Cancer (diagnosed 72, current age: 73, dx with early stage disease), skin cancer NOS  - Maternal Grandmother: Breast Cancer (diagnosed at age 45,  at 90)  - Dad: Prostate Cancer (AS, at age 72 treated)  - Paternal: Grandfather: Histocytoma     - No aunts with breast cancer   (Grandmother's sister: Breast Cancer, Bone Cancer)    Family History   Problem Relation Age of Onset    Skin cancer Mother     Hypertension Mother     Breast cancer Mother 72    Prostate cancer Father 70        RT    Hypertension Father     Breast cancer Maternal Grandmother 45    Alzheimer's disease Paternal Grandmother     Cancer Paternal Grandfather         histocytoma suspected to be from factor work    Hypertension Brother     Colon cancer Neg Hx     Esophageal cancer Neg Hx     Ovarian cancer Neg Hx        Social History:   Lives: from [place]   Occupation: Appliance Repair   Tobacco use: Former smoker (smoked from age [age]; 1 pack per day) = 17 pack years  Alcohol use: Social  Illicit drug use: Denies    reports that she has never smoked. She has never used smokeless tobacco. She reports that she does not currently use alcohol. She reports that she does not use drugs.     Allergies:  Review of patient's allergies indicates:   Allergen Reactions    Gluten protein Itching, Nausea And Vomiting and Swelling       Medications:  Current Outpatient Medications   Medication Instructions    buPROPion (WELLBUTRIN XL) 150 mg, Oral, Daily    dextroamphetamine-amphetamine (ADDERALL XR) 20 MG 24 hr capsule 20 mg, Oral, Every morning    [START ON 2023] dextroamphetamine-amphetamine (ADDERALL XR) 20 MG 24 hr capsule 20 mg, Oral, Every morning    [START ON 2023] dextroamphetamine-amphetamine (ADDERALL XR) 20 MG 24 hr capsule 20 mg, Oral, Every morning           Objective:   Vitals:   Vitals:    07/14/23 1110   BP: (!) 99/48   Pulse: 61   Resp: 18   Temp: 97.8 °F (36.6 °C)   TempSrc: Oral   SpO2: 99%   Weight: 55.5 kg (122 lb 5.7 oz)   Height: 5' (1.524 m)     BMI: Body mass index is 23.9 kg/m².    Physical Exam     General Appearance:    Alert, cooperative, no distress, appears stated age   Head:    Normocephalic, without obvious abnormality, atraumatic   Throat:   Lips, mucosa, and tongue normal; teeth and gums normal   Neck:   Supple, symmetrical, no adenopathy;     thyroid:  no enlargement/tenderness/nodules   Lungs:     Clear to auscultation bilaterally, respirations unlabored    Heart:    Regular rate and rhythm, S1 and S2 normal   Abdomen:     Soft, non-tender, bowel sounds active, no masses, no organomegaly   Extremities:   Extremities normal, atraumatic, no cyanosis or edema   Pulses:   2+ and symmetric all extremities   Skin:   Skin color, texture, turgor normal, no rashes or lesions   Lymph nodes:   Cervical, supraclavicular, and axillary nodes normal. No adenopathy   Neurologic:   CNII-XII intact, normal strength, sensation    Breast Exam: Bilateral breast normal. No masses, no tenderness, no skin or nipple abnormalities.  No lymphadenopathy.     Laboratory Data:  Lab Visit on 07/14/2023   Component Date Value Ref Range Status    Miscellaneous Genetic Test Name 07/14/2023 [facility] BRCA + 84/RNA   Final     No results for input(s): WBC, HGB, HCT, PLT in the last 2160 hours.  No results for input(s): CREATININE, AST, ALT in the last 2160 hours.    Invalid input(s): GFR, BILIRUBIN TOTAL, ALKALINE PHOSPHATASE  No results for input(s): IRON, FERRITIN in the last 2160 hours.    Imaging:    HPI:  5/3/23: Screening MMG: No suspicious masses, extremely dense breasts, TC: 30%    Assessment   Assessment:   Annalise Birch is a 43 y.o. female with no significant PMH. She presents for consultation to discuss her lifetime risk of breast cancer given strong family history of  breast cancer and extremely dense breast tissue noted on her screening mammogram.       * Tyrer-Cuzick (TC) Score: Categories: Average lifetime risk <15%, Intermediate risk 15-19%, and High risk > or = to 20%  Lifetime Risk of Breast Cancer 32.5%     10 year Risk of Breast Cancer. 5.9%     *The Gill Model for Breast Cancer Risk:   5-year Breast Cancer Risk 1.2% (Compared with 0.7% for the average 41 y.o. woman)   Lifetime Breast Cancer Risk 18.6% (Compared with 12.3% for the average 41 y.o. woman)   For the Gill Model, a woman's risk is considered low if her five-year risk of developing breast cancer is less than 1.6%; it is considered high if she scores above 1.66%. (All women who are over 60 have a score of at least 1.66 and are considered high risk, based on the Gill Model.)      - According to the American Cancer Society and NCCN guidelines, patients with a lifetime breast cancer risk of 20% or higher, defined by the Tyrer Cuzick score, may benefit from supplemental screening tests (breast MRI). In addition, endocrine therapy is associated with risk reduction of up to 49% in high risk patients defined as Gill Model 5-year breast cancer risk of ?1.7% or a 10-year risk by CARRILLO/Berthaer-Cuzick of ?5% or a history of LCIS (NSABP Breast Cancer Prevention Trial (P1-Study))     Given patient's above high-risk scores, we discussed:  - Imaging: Annual breast MRI in addition to annual mammograms -alternating imaging q 6 months until age 75 or older (Breast MRI in Nov 2023, Screening MMG in May 2024)  - Breast Exam: Breast exam twice yearly (alternating with OB/GYN); and self breast awareness   - Chemoprevention: Discussed side effects, risks, and benefits of endocrine therapy. Patient options out of chemoprevention for now. Revisit as Gill model 5 year risk increases with age.   - Genetics: Refer given strong family history of cancer  - Lifestyle modifications: discussed (see detailed patient information below)       # Other  Co-Morbidities:  - Other Cancer Screening: recommend she update all other cancer screening through her PCP (pap: 2021), colonoscopy: age 47, skin cancer screening (derm in Aug 2023)     Follow Up:  - Genetics testing pending, sent 7/14 (please message me with results)  - Breast MRI in Nov 2023 with labs (hcg and creatinine) prior (message with results)  - RTC in one year with screening MMG prior       Med Onc Chart Routing      Follow up with physician 1 year. RTC in May 2024 with screening mammogram one week prior   Follow up with [facility]    Infusion scheduling note    Injection scheduling note    Labs Other   Scheduling:  Preferred lab: beta hcg and creatinine  Lab interval:  prior to breast MRI   Imaging Other   Screening breast MRI in Nov 2023 with beta hcg and creatinine prior   Pharmacy appointment    Other referrals              1. At high risk for breast cancer      Problem List Items Addressed This Visit          Renal/    At high risk for breast cancer    Relevant Orders    MRI Breast w/wo Contrast, w/CAD, Bilateral    B-HCG    Creatinine, serum    Mammo Digital Screening Bilat       Assessment   _______________________________________________________________________________________________________________________________  DETAILED PATIENT INFORMATION BELOW:   - Discussed with patient that there are several models available for stratifying breast cancer risk, and Marjorieck is presently the model utilized by [facility] and is a model recommended per current NCCN guidelines.  The Gill Model for Breast Cancer risk estimates the absolute 5 year risk and lifetime risk of developing breast cancer. Family history includes only first degree relatives with breast cancer, which is not enough information to estimate the risk of a patient having BRCA mutation. It also underestimates the cancer risk for patients with extensive family history. The Gill Model is a good predictor of risk for populations but  not for individuals. It adjusts risk for race/ethnicity. It may underestimate breast cancer risk in patients with atypical hyperplasia and strong family history. The Gill Model was NOT designed to estimate risk for: Women with a prior diagnosis of breast cancer, lobular carcinoma in situ (LCIS), or ductal carcinoma in situ (DCIS); Women who have received previous radiation therapy to the chest for treatment of Hodgkin lymphoma;  Women with gene mutations in BRCA1 or BRCA2, or those who are known to have certain genetic syndromes that increase risk for breast cancer; Women of age <35 or >85. We discussed that there are limitations to every model for risk assessment, particularly that TC can overestimate risk in women with atypical hyperplasia and dense breasts and that Gill underestimates risk for those with a strong family history of breast or ovarian cancers as well as non-white women with atypical hyperplasia which can make them appear to not be candidates for risk reducing therapies.    Screening (If TC 20% or greater)   - Semiannual (every 6 months) CBE (clinical breast exam).   - Annual Breast MRI and Annual MMG - alternating q 6 months until 75 or older     The use of MRI for breast cancer detection is based on the concept of  tumor angiogenesis or neovascularity. Tumor-associated blood vessels have increased permeability, which leads to prompt uptake and release of gadolinium within the first one to two minutes after administration, leading to a pattern of rapid enhancement and washout on MRI.   Bilateral breast examination - Both breasts should be evaluated in an MRI study, for comparison purposes, even when concern about possible pathology involves only one breast.  Contrast - Intravenous gadolinium contrast must be used to maximize cancer detection and is administered before breast MRI to highlight the neovascularity associated with cancers. Contrast is not necessary when the study is performed to evaluate  "silicone implant integrity.  Allergic and anaphylactoid reactions to gadolinium are rare, but can occur. In addition, in patients with renal failure, gadolinium can cause contrast nephropathy and/or nephrogenic systemic fibrosis.   A few studies have also reported gadolinium deposition in the brain from repeated intravenous administration, with the degree of deposition varying based on the specific contrast agent. The clinical significance of this deposition remains unknown, and no data for humans exist to show any adverse effects or harm at this time.   She understands that she may contact her insurance company with regards to coverage of MRI breasts.   She can not undergo an MRI if pregnant.   We discussed that MRI's may have false positives     FDA  link about Gadolinium exposure:  https://www.fda.gov/drugs/drug-safety-and-availability/fda-drug-safety-communication-fda-identifies-no-harmful-effects-date-brain-retention-gadolinium   https://www.fda.gov/Drugs/DrugSafety/ekx525995.htm        TC to determine MRI and Gill to determine chemoprevention   Recommendation for women with elevated TC score:         Recommendation for women with elevated Gill Model.         2. Breast Cancer Risk Reduction Therapy   - For women at high risk for breast cancer (defined below in the MD Kervin Algorithm, see graph labeled "risk categories"), endocrine therapy can reduce the risk of invasive and/or in situ breast cancers.   - Risk-reduction agents (ie, tamoxifen, raloxifene, anastrozole, exemestane) are recommended for individuals ?35 years of age only, as the utility of these agents in those younger than 35 years is unknown. Tamoxifen is the only agent indicated for premenopausal patients, whereas all 4 agents may be used in those who are postmenopausal  - Trials:   A. CARRILLO-II: Fatmata KENNY, Catalina I, Jeffrey BRODERICK, Ivana M, Quinten J, Cruz S, Palmira C, Roche N, Cole RE, von Ba G, Lorraine B, Kelly T, Lukasz A; CARRILLO-II " investigators. Anastrozole for prevention of breast cancer in high-risk postmenopausal women (CARRILLO-II): an international, double-blind, randomised placebo-controlled trial. Lancet. 2014 Mar 22;383(9196):1041-8. doi: 10.1016/-2371(07)4277711-4. Epub 2013 Dec 12. Erratum in: Lancet. 2014 Mar 22;383(4959):1040. Erratum in: Lancet. 2017 Mar 11;389(73443):1010. PMID: 90003000.     B. [facility] Breast Cancer Prevention Trial (P1-Study):  Randomized clinical trial of healthy individuals aged 60 years or older, aged 35 to 59 years with a 1.7% or greater cumulative 5-year risk for developing breast cancer, or with a history of LCIS, were randomized to tamoxifen or placebo. Tamoxifen 20 mg daily for 5 years has shown to reduce risk of breast cancer by 49%. Among individuals with a history of atypical hyperplasia (ADH/ALH), this dose and duration of tamoxifen was associated with an 86% reduction in breast cancer risk.   - Aromatase inhibitors for 5 years have also shown risk reduction. At current, there is not adequate data to recommend longer courses of therapy more than 5 years for risk reduction.   - The results of the P-1 study showed that treatment with tamoxifen decreased the short-term risk for breast cancer by 49% in healthy individuals aged 35 years or older who had an increased risk for the disease.  The absolute risk reduction was 21.4 cases per 1000 over 5 years.100 In terms of numbers needed to treat, this corresponds to treatment of 47 individuals with tamoxifen to prevent 1 case of invasive breast cancer. The reduction in invasive breast cancer risk in participants with AH was particularly striking (RR, 0.14; 95% CI, 0.03-0.47) in the initial study analysis, and an RR of 0.25 (95% CI, 0.10-0.52) was found after 7 years of follow-up.   - Above have been shown to lower the risk of breast cancer incidence, however there is no survival benefit in patients who don't have breast cancer.   -  For healthy, high-risk,  premenopausal individuals, data regarding the risk/benefit ratio for tamoxifen appear relatively  favorable (category 1). For high-risk postmenopausal individuals, data regarding the risk/benefit ratio for tamoxifen are influenced by age, presence of uterus, or comorbid conditions (category 1)  - Reviewed risks of Tamoxifen side effects include hot flashes, invasive endometrial cancer in women > 49 years of age (2.3/1000 compared to 0.9/1000), cataracts, increased risk of VTE including pulmonary embolism among others.          3. Lifestyle modifications which have shown benefit:  - Limit alcohol consumption to less than 1 drink per day (1 ounce liquor, 6 oz wine, 8 oz beer)  - Avoid smoking.  - Exercise at least 150 minutes per week of moderate intensity aerobic activity or at least 75 minutes of vigorous activity. Exercise can lower the relative risk of breast cancer by ~18-20%.  - Maintain healthy weight and avoid post-menopausal weight gain. Avoid processed foods and eat more lean proteins, fruits and vegetables.   - Discussed available resources including genetic counseling, nutrition, weight management.     4. Factors associated with greater breast cancer risk:  Risk factors are categorized into 2 groups: Modifiable and Non-modifiable. Modifiable risk factors include use of hormones, alcohol, smoking, diet and exercise. Non-modifiable risk factors include breast density, genetics, chest radiation, previous pregnancies, age of first period, and age of menopause.   -Increasing age -- The risk of breast cancer increases with older age.  -Female sex  -White race (In the [place], the highest breast cancer risk occurs among White women, although breast cancer remains the most common cancer among women of every major ethnic/racial group )  -Weight and body fat in postmenopausal women -- Obesity (defined as body mass index [BMI] ?30 kg/m2) is associated with an overall increase  in morbidity and mortality.  However, the risk of breast cancer associated with BMI differs by menopausal status.   ?Postmenopausal women - A higher BMI and/or perimenopausal weight gain have been consistently associated with a higher risk of breast cancer among postmenopausal women. The association between a higher BMI and postmenopausal breast cancer risk may be mediated by higher estrogen levels resulting from the peripheral conversion of estrogen precursors (from adipose tissue) to estrogen    ?Inverse relationship in premenopausal women - Unlike postmenopausal women, an increased BMI is associated with a lower risk of breast cancer in premenopausal women, particularly in early adulthood. In a multicenter analysis using pooled individual-level data from approximately 760,000 premenopausal women from 19 prospective cohorts, there was a 4.2-fold increased risk between the lowest and highest BMI categories (BMI <17 versus ?35) at ages 18 to 24 years. The explanation of this finding remains unclear.  -Tall stature -women who were >175 cm (69 inches) tall were 20 percent more likely to develop breast cancer than those <160 cm (63 inches) tall.  -Benign breast disease  -Dense breast tissue -- The density of breast tissue reflects the relative amount of glandular and connective tissue (parenchyma) to adipose tissue. Women with mammographically dense breast tissue, generally defined as dense tissue comprising ?75 percent of the breast, have a four to five times higher breast cancer risk compared with women of similar age with less or no dense tissue. Although breast density is a largely inherited trait, other factors can influence density. For example, lower density has been associated with higher levels of physical activity  and with a low-fat, high-carbohydrate diet. In postmenopausal women, estrogen and progesterone increase breast density  while the ER antagonist tamoxifen decreases breast density.  Despite the association of exogenous hormones  with breast density, breast density is not strongly correlated with endogenous hormone levels. Breast tend to become more fatty with age.   -Bone mineral density -In multiple studies, women with higher bone density have a higher breast cancer risk  -Hormonal factors   -Reproductive factors -Earlier menarche or later menopause, Nulliparity, Increasing age at first full-term pregnancy.   -Personal and family history of breast cancer  -Alcohol use and smoking  -Exposure to therapeutic ionizing radiation        MDM includes:    - Review of prior external notes from unique source and review of diagnostic tests and information  - Independent review and explanation of 3+ results from unique tests   - Discussion of management and ordering 3+ unique tests   - Extensive discussion of treatment and management including consideration of possible diagnoses and management options    - Patient was fully informed of current medical plan. All questions were answered to the patient's satisfaction and patient verbalized understanding of information and agreement with the plan. Advised patient to RTC with any interval changes or concerns.    - Overall, I discussed the diagnosis, history, stage, labs/imaging, prognosis, management, and treatment plan as applicable. I reviewed adverse short and long term effects as applicable.   - Informed patient if symptoms are new or worsening, that it is their responsibility to call the clinic and schedule follow up sooner than stated follow up. Also informed patient if they do not hear from the appointment center in 2-5 business days for their referrals, the patient must call the Oncology clinic so we can follow up on procedures or referral scheduling.      - Visit time I spent with the patient: 60 minutes of total time spent on the encounter, including counseling patient and/or coordinating care, which includes face to face time and non-face to face time preparing to see the patient (eg, review of  tests, chart review), Obtaining and/or reviewing separately obtained history, Documenting clinical information in the electronic or other health record, Independently interpreting results (not separately reported) and communicating results to the patient/family/caregiver, or Care coordination (not separately reported).     Signed   Rosita Will MD  Hematology & Medical Oncology

## 2023-08-07 LAB
GENETIC COUNSELING?: YES
GENSO SPECIMEN TYPE: NORMAL
MISCELLANEOUS GENETIC TEST NAME: NORMAL
PARTENTAL OR SIBLING TESTING?: NO
REFERENCE LAB: NORMAL
TEST RESULT: NORMAL

## 2023-08-10 ENCOUNTER — PATIENT MESSAGE (OUTPATIENT)
Dept: HEMATOLOGY/ONCOLOGY | Facility: CLINIC | Age: 43
End: 2023-08-10

## 2023-08-10 ENCOUNTER — OFFICE VISIT (OUTPATIENT)
Dept: HEMATOLOGY/ONCOLOGY | Facility: CLINIC | Age: 43
End: 2023-08-10
Payer: COMMERCIAL

## 2023-08-10 DIAGNOSIS — Z80.3 FAMILY HISTORY OF BREAST CANCER: ICD-10-CM

## 2023-08-10 DIAGNOSIS — Z91.89 AT HIGH RISK FOR BREAST CANCER: ICD-10-CM

## 2023-08-10 DIAGNOSIS — Z71.83 ENCOUNTER FOR NONPROCREATIVE GENETIC COUNSELING: Primary | ICD-10-CM

## 2023-08-10 PROCEDURE — 99213 OFFICE O/P EST LOW 20 MIN: CPT | Mod: 95,,, | Performed by: PHYSICIAN ASSISTANT

## 2023-08-10 PROCEDURE — 99213 PR OFFICE/OUTPT VISIT, EST, LEVL III, 20-29 MIN: ICD-10-PCS | Mod: 95,,, | Performed by: PHYSICIAN ASSISTANT

## 2023-08-10 NOTE — PROGRESS NOTES
Cancer Genetics  Hereditary/High Risk Clinic  Department of Hematology and Oncology  [facility]    Results Disclosure & Post-Test Counseling    Date of Service:  8/10/23  Visit Provider:  Carole Mohan PA-C  Collaborating Physician:  Della Brown MD    Patient:  Annalise Birch  :  1980  MRN:  [id number]     Televisit Information  Patient location: [place].    Visit type:  audiovisual  Face-to-face time with patient: approximately 10 minutes.  Approximately 20 minutes in total were spent on this encounter, which includes face-to-face time and non-face-to-face time preparing to see the patient (e.g., review of tests), obtaining and/or reviewing separately obtained history, documenting clinical information in the electronic or other health record, independently interpreting results (not separately reported) and communicating results to the patient/family/caregiver, or care coordination (not separately reported).  Each patient to whom he or she provides medical services by telemedicine is:  (1) informed of the relationship between the physician and patient and the respective role of any other health care provider with respect to management of the patient; and (2) notified that he or she may decline to receive medical services by telemedicine and may withdraw from such care at any time.    ASSESSMENT   Test name:  [facility]Cancer +RNA (84 genes)  Testing laboratory:  [facility]  Specimen type:  Blood  Genes analyzed:  (84) AIP, ALK, APC, BRYN, AXIN2, BAP1, BARD1, BLM, BMPR1A, BRCA1, BRCA2, BRIP1, CASR, CDC73, CDH1, CDK4, CDKN1B, CDKN1C, CDKN2A, CEBPA, CHEK2, CTNNA1, DICER1, DIS3L2, EGFR, EPCAM, FH, FLCN, GATA2, GPC3, GREM1, HOXB13, HRAS, KIT, MAX, MEN1, MET, MITF, MLH1, MSH2, MSH3, MSH6, MUTYH, NBN, NF1, NF2, NTHL1, PALB2, PDGFRA, PHOX2B, PMS2, POLD1, POLE, POT1, MVQVW0P, PTCH1, PTEN, RAD50, RAD51C, RAD51D, RB1, RECQL4, RET, RUNX1, SDHA, SDHAF2, SDHB, SDHC, SDHD, SMAD4, SMARCA4, SMARCB1, SMARCE1,  STK11, SUFU, TERC, TERT, MBLR028, TP53, TSC1, TSC2, VHL, WRN, WT1.  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 No action is needed for a VUS. The genetics lab will notify the patient and ordering provider when the variant is reclassified as either pathogenic/likely pathogenic or benign/likely benign. Approximately 90% of VUS's are reclassified as benign/likely benign, meaning they are harmless and do not cause cancer or other disease. Therefore, no clinical decisions should be made based on a VUS, even if the variant seems to explain the cancers in the individual and/or family. In most cases, this is just a coincidence.     A copy of the results report is available in Media.     Annalise Birch has no personal history of cancer. Her family history is significant for breast cancer in her mother at age 72 and maternal grandmother at approximately age 45. Her grandmother did not undergo genetic testing and her mother declined testing. Annalise underwent comprehensive germline genetic testing was negative for pathogenic (cancer-causing) mutations in the genes associated with hereditary breast and other cancers.    In regards to her family history of cancer, this is considered an uninformative negative, as it is unknown if Annalise's relatives with cancer have/had a mutation she did not inherit.   The NTHL1 gene is associated with autosomal recessive NTHL1-associated polyposis, so it would not be related to the cancers in her family.   Based on family history and personal risk factors, Annalise is at high risk for breast cancer with a lifetime risk of approximately 30%, as calculated using the  [facility].     PLAN   No further genetic testing is indicated.   No action is needed for the VUS. [facility] will notify the patient and me when the variant is reclassified.  Continue breast cancer screening with Dr. Rosita Will in the [facility].  Annual well woman exam for breast and gynecologic cancer screening.   Colonoscopy every 10 years beginning at age 45.   Cancer risk-reduction strategies, including diet, exercise and avoidance of carcinogens.      Visit diagnoses:   1. Encounter for nonprocreative genetic counseling    2. Family history of breast cancer     Questions were encouraged and answered to the patient's satisfaction, and she verbalized understanding of information and agreement with the plan.       SUBJECTIVE   Chief Complaint: Post-test genetic counseling  History of Present Illness (HPI):  Annalise Birch was previously seen by me and underwent germline genetic testing. She returns today to review the results and recommendations.   Medical, surgical, family history: Reviewed. No pertinent changes.     OBJECTIVE   Physical Exam  Limited secondary to the inherent nature of a virtual visit.  Very pleasant patient.  Constitutional: No apparent distress.   Neurological: Alert and oriented. No obvious neurological deficits.   Psychiatric: Normal mood, affect, thought content, speech, behavior, judgment.    REFERENCES   National Comprehensive Cancer Network (NCCN). (2021). Genetic/familial high-risk assessment: Breast, ovarian, and pancreatic. NCCN Clinical Practice Guidelines in Oncology (NCCN Guidelines), Version 1.2022.  National Comprehensive Cancer Network (NCCN). (2021). Breast cancer risk reduction. NCCN Clinical Practice Guidelines in Oncology (NCCN Guidelines), Version 1.2021.  National Comprehensive Cancer Network (NCCN). (2021). Breast cancer screening and diagnosis. NCCN Clinical Practice Guidelines in Oncology (NCCN Guidelines), Version 1.2021.  National Comprehensive  Cancer Network (NCCN). (2021). Colorectal cancer screening. NCCN Clinical Practice Guidelines in Oncology (NCCN Guidelines), Version 2.2021.      Carole Mohan PA-C, MPAS, PA-C  Physician Assistant, Hereditary/High Risk Clinic  Hematology/Oncology, [facility]    Cc: Agustina Flores NP; Rosita Will MD

## 2023-10-06 ENCOUNTER — PATIENT MESSAGE (OUTPATIENT)
Dept: PSYCHIATRY | Facility: CLINIC | Age: 43
End: 2023-10-06
Payer: COMMERCIAL